# Patient Record
Sex: FEMALE | Race: WHITE | Employment: UNEMPLOYED | URBAN - METROPOLITAN AREA
[De-identification: names, ages, dates, MRNs, and addresses within clinical notes are randomized per-mention and may not be internally consistent; named-entity substitution may affect disease eponyms.]

---

## 2017-01-20 ENCOUNTER — TELEPHONE (OUTPATIENT)
Dept: INTERNAL MEDICINE CLINIC | Facility: CLINIC | Age: 32
End: 2017-01-20

## 2017-01-20 RX ORDER — AZITHROMYCIN 250 MG/1
TABLET, FILM COATED ORAL
Qty: 6 TABLET | Refills: 0 | Status: SHIPPED | OUTPATIENT
Start: 2017-01-20 | End: 2017-05-15 | Stop reason: ALTCHOICE

## 2017-01-20 NOTE — TELEPHONE ENCOUNTER
Please advise - called patient who states she has a very sore throat for a couple of days , with earache, sinuspressure and headache , glands are swollen , denies cough and fever- cannot come into office and will go out of town-to DR. FORDE Sunday

## 2017-01-20 NOTE — TELEPHONE ENCOUNTER
Patient called back and was notified that Jaiden Alicea prescribed by Tommie Barba and eRxed to her pharmacy, encouraged patient to use OTC cough remedies if needed and call us on Monday or Tuesday if not better

## 2017-01-20 NOTE — TELEPHONE ENCOUNTER
Okay to send in a Z-Niels for her, and encouraged her to use over-the-counter cough remedies if needed along with the antibiotics, have her call us on Monday or Tuesday if she is not better.

## 2017-01-20 NOTE — TELEPHONE ENCOUNTER
Pt. Called stating she has swollen glands and a very sore throat. She is not able to come in today and will be out of town for work next week. Can Dr. Amish Giron something for her? She uses [facility] [address].

## 2017-04-01 ENCOUNTER — APPOINTMENT (OUTPATIENT)
Dept: LAB | Facility: HOSPITAL | Age: 32
End: 2017-04-01
Attending: OBSTETRICS & GYNECOLOGY

## 2017-04-01 DIAGNOSIS — N92.6 IRREGULAR MENSES: ICD-10-CM

## 2017-04-01 PROCEDURE — 84443 ASSAY THYROID STIM HORMONE: CPT

## 2017-04-01 PROCEDURE — 36415 COLL VENOUS BLD VENIPUNCTURE: CPT

## 2017-04-03 PROCEDURE — 88175 CYTOPATH C/V AUTO FLUID REDO: CPT | Performed by: OBSTETRICS & GYNECOLOGY

## 2017-12-18 ENCOUNTER — TELEPHONE (OUTPATIENT)
Dept: INTERNAL MEDICINE CLINIC | Facility: CLINIC | Age: 32
End: 2017-12-18

## 2017-12-18 RX ORDER — AZITHROMYCIN 250 MG/1
TABLET, FILM COATED ORAL
Qty: 6 TABLET | Refills: 0 | Status: SHIPPED | OUTPATIENT
Start: 2017-12-18 | End: 2019-05-25

## 2017-12-18 NOTE — TELEPHONE ENCOUNTER
Noted, you can offer her a Z-Niels, but encourage her to come in and see me, she does not see me in a couple years, try to get her on my schedule for next year for a physical.

## 2017-12-18 NOTE — TELEPHONE ENCOUNTER
Patient has a really bad cold/Has loose productive cough, nasal congestion with yellow mucous  Tried over the counter meds which sometimes helps - unable to come in to the office   Can Dr Allen Allen prescribe for patient - [facility]      Ple

## 2018-02-21 PROCEDURE — 84144 ASSAY OF PROGESTERONE: CPT | Performed by: OBSTETRICS & GYNECOLOGY

## 2018-03-03 ENCOUNTER — HOSPITAL (OUTPATIENT)
Dept: OTHER | Age: 33
End: 2018-03-03
Attending: EMERGENCY MEDICINE

## 2018-03-03 LAB
AMORPH SED URNS QL MICRO: ABNORMAL
ANALYZER ANC (IANC): ABNORMAL
APPEARANCE UR: CLEAR
BACTERIA #/AREA URNS HPF: ABNORMAL /HPF
BASOPHILS # BLD: 0 THOUSAND/MCL (ref 0–0.3)
BASOPHILS NFR BLD: 0 %
BILIRUB UR QL: NEGATIVE
CAOX CRY URNS QL MICRO: ABNORMAL
COLOR UR: COLORLESS
DIFFERENTIAL METHOD BLD: ABNORMAL
EOSINOPHIL # BLD: 0.3 THOUSAND/MCL (ref 0.1–0.5)
EOSINOPHIL NFR BLD: 3 %
EPITH CASTS #/AREA URNS LPF: ABNORMAL /[LPF]
ERYTHROCYTE [DISTWIDTH] IN BLOOD: 11.2 % (ref 11–15)
FATTY CASTS #/AREA URNS LPF: ABNORMAL /[LPF]
GLUCOSE UR-MCNC: NEGATIVE MG/DL
GRAN CASTS #/AREA URNS LPF: ABNORMAL /[LPF]
HCG SERPL-ACNC: ABNORMAL MUNIT/ML
HEMATOCRIT: 37.2 % (ref 36–46.5)
HGB BLD-MCNC: 13.2 GM/DL (ref 12–15.5)
HGB UR QL: NEGATIVE
HYALINE CASTS #/AREA URNS LPF: ABNORMAL /LPF (ref 0–5)
KETONES UR-MCNC: NEGATIVE MG/DL
LEUKOCYTE ESTERASE UR QL STRIP: NEGATIVE
LYMPHOCYTES # BLD: 2.6 THOUSAND/MCL (ref 1–4.8)
LYMPHOCYTES NFR BLD: 26 %
MCH RBC QN AUTO: 33.2 PG (ref 26–34)
MCHC RBC AUTO-ENTMCNC: 35.5 GM/DL (ref 32–36.5)
MCV RBC AUTO: 93.7 FL (ref 78–100)
MIXED CELL CASTS #/AREA URNS LPF: ABNORMAL /[LPF]
MONOCYTES # BLD: 0.8 THOUSAND/MCL (ref 0.3–0.9)
MONOCYTES NFR BLD: 8 %
MUCOUS THREADS URNS QL MICRO: ABNORMAL
NEUTROPHILS # BLD: 6.5 THOUSAND/MCL (ref 1.8–7.7)
NEUTROPHILS NFR BLD: 63 %
NEUTS SEG NFR BLD: ABNORMAL %
NITRITE UR QL: NEGATIVE
PERCENT NRBC: ABNORMAL
PH UR: 6 UNIT (ref 5–7)
PLATELET # BLD: 397 THOUSAND/MCL (ref 140–450)
PROT UR QL: NEGATIVE MG/DL
RBC # BLD: 3.97 MILLION/MCL (ref 4–5.2)
RBC #/AREA URNS HPF: ABNORMAL /HPF (ref 0–3)
RBC CASTS #/AREA URNS LPF: ABNORMAL /[LPF]
RENAL EPI CELLS #/AREA URNS HPF: ABNORMAL /[HPF]
SP GR UR: <1.005 (ref 1–1.03)
SPECIMEN SOURCE: ABNORMAL
SPERM URNS QL MICRO: ABNORMAL
SQUAMOUS #/AREA URNS HPF: ABNORMAL /HPF (ref 0–5)
T VAGINALIS URNS QL MICRO: ABNORMAL
TRI-PHOS CRY URNS QL MICRO: ABNORMAL
URATE CRY URNS QL MICRO: ABNORMAL
URINE REFLEX: ABNORMAL
URNS CMNT MICRO: ABNORMAL
UROBILINOGEN UR QL: 0.2 MG/DL (ref 0–1)
WAXY CASTS #/AREA URNS LPF: ABNORMAL /[LPF]
WBC # BLD: 10.2 THOUSAND/MCL (ref 4.2–11)
WBC #/AREA URNS HPF: ABNORMAL /HPF (ref 0–5)
WBC CASTS #/AREA URNS LPF: ABNORMAL /[LPF]
YEAST HYPHAE URNS QL MICRO: ABNORMAL
YEAST URNS QL MICRO: ABNORMAL

## 2018-03-20 ENCOUNTER — HOSPITAL (OUTPATIENT)
Dept: OTHER | Age: 33
End: 2018-03-20
Attending: OBSTETRICS & GYNECOLOGY

## 2018-03-20 PROBLEM — O09.829: Status: ACTIVE | Noted: 2018-03-20

## 2018-03-20 PROBLEM — Z34.90 PREGNANT: Status: ACTIVE | Noted: 2018-03-20

## 2018-03-20 PROBLEM — Z34.90 PREGNANT (HCC): Status: ACTIVE | Noted: 2018-03-20

## 2018-03-20 LAB
ANALYZER ANC (IANC): ABNORMAL
BASOPHILS # BLD: 0 THOUSAND/MCL (ref 0–0.3)
BASOPHILS NFR BLD: 0 %
DIFFERENTIAL METHOD BLD: ABNORMAL
EOSINOPHIL # BLD: 0.5 THOUSAND/MCL (ref 0.1–0.5)
EOSINOPHIL NFR BLD: 5 %
ERYTHROCYTE [DISTWIDTH] IN BLOOD: 11.4 % (ref 11–15)
HBV SURFACE AG SER QL: NEGATIVE
HEMATOCRIT: 35.9 % (ref 36–46.5)
HGB BLD-MCNC: 12.1 GM/DL (ref 12–15.5)
HIV ANTIGEN/ANTIBODY SCREEN: NONREACTIVE
LYMPHOCYTES # BLD: 2.5 THOUSAND/MCL (ref 1–4.8)
LYMPHOCYTES NFR BLD: 23 %
MCH RBC QN AUTO: 31.8 PG (ref 26–34)
MCHC RBC AUTO-ENTMCNC: 33.7 GM/DL (ref 32–36.5)
MCV RBC AUTO: 94.2 FL (ref 78–100)
MONOCYTES # BLD: 0.7 THOUSAND/MCL (ref 0.3–0.9)
MONOCYTES NFR BLD: 6 %
NEUTROPHILS # BLD: 7 THOUSAND/MCL (ref 1.8–7.7)
NEUTROPHILS NFR BLD: 66 %
NEUTS SEG NFR BLD: ABNORMAL %
PERCENT NRBC: ABNORMAL
PLATELET # BLD: 362 THOUSAND/MCL (ref 140–450)
RBC # BLD: 3.81 MILLION/MCL (ref 4–5.2)
RUBV IGG SERPL IA-ACNC: 150.2 UNIT/ML
T PALLIDUM IGG SER QL IA: NONREACTIVE
WBC # BLD: 10.7 THOUSAND/MCL (ref 4.2–11)

## 2018-04-11 PROBLEM — O99.340 ANXIETY IN PREGNANCY, ANTEPARTUM (HCC): Status: ACTIVE | Noted: 2018-04-11

## 2018-04-11 PROBLEM — O99.340 ANXIETY IN PREGNANCY, ANTEPARTUM: Status: ACTIVE | Noted: 2018-04-11

## 2018-04-11 PROBLEM — F41.9 ANXIETY IN PREGNANCY, ANTEPARTUM: Status: ACTIVE | Noted: 2018-04-11

## 2018-04-11 PROBLEM — F41.9 ANXIETY IN PREGNANCY, ANTEPARTUM (HCC): Status: ACTIVE | Noted: 2018-04-11

## 2018-06-13 PROCEDURE — 87591 N.GONORRHOEAE DNA AMP PROB: CPT | Performed by: OBSTETRICS & GYNECOLOGY

## 2018-06-13 PROCEDURE — 87491 CHLMYD TRACH DNA AMP PROBE: CPT | Performed by: OBSTETRICS & GYNECOLOGY

## 2018-08-06 PROBLEM — O99.019 ANTEPARTUM ANEMIA: Status: ACTIVE | Noted: 2018-08-06

## 2018-08-06 PROBLEM — O99.019 ANTEPARTUM ANEMIA (HCC): Status: ACTIVE | Noted: 2018-08-06

## 2018-08-06 PROCEDURE — 87389 HIV-1 AG W/HIV-1&-2 AB AG IA: CPT | Performed by: OBSTETRICS & GYNECOLOGY

## 2018-10-24 ENCOUNTER — HOSPITAL (OUTPATIENT)
Dept: OTHER | Age: 33
End: 2018-10-24
Attending: OBSTETRICS & GYNECOLOGY

## 2018-10-24 LAB
ANALYZER ANC (IANC): ABNORMAL
BASOPHILS # BLD: 0 THOUSAND/MCL (ref 0–0.3)
BASOPHILS NFR BLD: 0 %
DIFFERENTIAL METHOD BLD: ABNORMAL
EOSINOPHIL # BLD: 0.4 THOUSAND/MCL (ref 0.1–0.5)
EOSINOPHIL NFR BLD: 4 %
ERYTHROCYTE [DISTWIDTH] IN BLOOD: 13.1 % (ref 11–15)
HEMATOCRIT: 32.9 % (ref 36–46.5)
HGB BLD-MCNC: 11 GM/DL (ref 12–15.5)
LYMPHOCYTES # BLD: 2 THOUSAND/MCL (ref 1–4.8)
LYMPHOCYTES NFR BLD: 21 %
MCH RBC QN AUTO: 30.7 PG (ref 26–34)
MCHC RBC AUTO-ENTMCNC: 33.4 GM/DL (ref 32–36.5)
MCV RBC AUTO: 91.9 FL (ref 78–100)
MONOCYTES # BLD: 0.8 THOUSAND/MCL (ref 0.3–0.9)
MONOCYTES NFR BLD: 8 %
NEUTROPHILS # BLD: 6.6 THOUSAND/MCL (ref 1.8–7.7)
NEUTROPHILS NFR BLD: 67 %
NEUTS SEG NFR BLD: ABNORMAL %
NRBC (NRBCRE): ABNORMAL
PLATELET # BLD: 199 THOUSAND/MCL (ref 140–450)
RBC # BLD: 3.58 MILLION/MCL (ref 4–5.2)
WBC # BLD: 9.9 THOUSAND/MCL (ref 4.2–11)

## 2018-10-25 LAB
ANALYZER ANC (IANC): ABNORMAL
ANION GAP SERPL CALC-SCNC: 14 MMOL/L (ref 10–20)
BASE DEFICIT BLDCOA-SCNC: NORMAL MMOL/L
BASE DEFICIT BLDCOV-SCNC: 3 MMOL/L
BASE EXCESS BLDCOA CALC-SCNC: 0 MMOL/L
BASE EXCESS-RC: NORMAL
BUN SERPL-MCNC: 7 MG/DL (ref 6–20)
BUN/CREAT SERPL: 10 (ref 7–25)
CALCIUM SERPL-MCNC: 8 MG/DL (ref 8.4–10.2)
CHLORIDE: 105 MMOL/L (ref 98–107)
CO2 SERPL-SCNC: 23 MMOL/L (ref 21–32)
CREAT SERPL-MCNC: 0.71 MG/DL (ref 0.51–0.95)
ERYTHROCYTE [DISTWIDTH] IN BLOOD: 13.1 % (ref 11–15)
GLUCOSE SERPL-MCNC: 100 MG/DL (ref 65–99)
HCO3 BLDCOA-SCNC: 28 MMOL/L (ref 21–28)
HCO3 BLDCOV-SCNC: 23 MMOL/L (ref 22–29)
HEMATOCRIT: 27.6 % (ref 36–46.5)
HGB BLD-MCNC: 9.1 GM/DL (ref 12–15.5)
MCH RBC QN AUTO: 30.2 PG (ref 26–34)
MCHC RBC AUTO-ENTMCNC: 33 GM/DL (ref 32–36.5)
MCV RBC AUTO: 91.7 FL (ref 78–100)
NRBC (NRBCRE): ABNORMAL
PCO2 BLDCOA: 60 MM HG (ref 31–74)
PCO2 BLDCOV: 43 MM HG (ref 23–49)
PH BLDCOA: 7.28 UNIT (ref 7.18–7.38)
PH BLDCOV: 7.34 UNIT (ref 7.25–7.45)
PLATELET # BLD: 181 THOUSAND/MCL (ref 140–450)
PO2 BLDCOV: 31 MM HG (ref 17–41)
PO2 RC ARTERIAL CORD (RACPO): <20 MM HG (ref 6–31)
POTASSIUM SERPL-SCNC: 3.9 MMOL/L (ref 3.4–5.1)
RBC # BLD: 3.01 MILLION/MCL (ref 4–5.2)
SODIUM SERPL-SCNC: 138 MMOL/L (ref 135–145)
WBC # BLD: 15.8 THOUSAND/MCL (ref 4.2–11)

## 2018-11-06 PROBLEM — Z34.90 PREGNANT: Status: RESOLVED | Noted: 2018-03-20 | Resolved: 2018-11-06

## 2018-11-06 PROBLEM — Z34.90 PREGNANT (HCC): Status: RESOLVED | Noted: 2018-03-20 | Resolved: 2018-11-06

## 2019-05-25 ENCOUNTER — NURSE ONLY (OUTPATIENT)
Dept: FAMILY MEDICINE CLINIC | Facility: CLINIC | Age: 34
End: 2019-05-25
Payer: COMMERCIAL

## 2019-05-25 VITALS
TEMPERATURE: 98 F | OXYGEN SATURATION: 98 % | RESPIRATION RATE: 16 BRPM | DIASTOLIC BLOOD PRESSURE: 65 MMHG | BODY MASS INDEX: 24.15 KG/M2 | HEART RATE: 97 BPM | HEIGHT: 60 IN | SYSTOLIC BLOOD PRESSURE: 102 MMHG | WEIGHT: 123 LBS

## 2019-05-25 DIAGNOSIS — J30.2 SEASONAL ALLERGIES: Primary | ICD-10-CM

## 2019-05-25 DIAGNOSIS — B97.89 ACUTE VIRAL SINUSITIS: ICD-10-CM

## 2019-05-25 DIAGNOSIS — J01.90 ACUTE VIRAL SINUSITIS: ICD-10-CM

## 2019-05-25 PROCEDURE — 99202 OFFICE O/P NEW SF 15 MIN: CPT | Performed by: NURSE PRACTITIONER

## 2019-05-25 NOTE — PROGRESS NOTES
CHIEF COMPLAINT:   Patient presents with:  Sinus Problem: X 2 days      HPI:   Sherren [place] is a 34year old female who presents for cold symptoms for  2  days. Reports symptoms started 2 days ago with itchy eyes and throat.   Symptoms have progressed Alcohol/week: 0.0 oz      Comment: occ    Drug use: No        REVIEW OF SYSTEMS:   GENERAL:  denies  diminished appetite  SKIN: no rashes or abnormal skin lesions  HEENT: See HPI.     LUNGS: denies shortness of breath or wheezing, See HPI  CARDIOVASCULA Can do Benadryl at night as needed  Sudafed as needed  Follow up for worsening symptoms or no improvement    Allergic Rhinitis  Allergic rhinitis is an allergic reaction that affects the nose, and often the eyes. It’s often known as nasal allergies.  Nasal · Fix water leaks  Mold:  · Keep humidity low by using a dehumidifier or air conditioner. Keep the dehumidifier and air conditioner clean and free of mold. · Clean moldy areas with bleach and water. In general:  · Vacuum once or twice a week.  If possible · Drink plenty of water, hot tea, and other liquids. This may help thin nasal mucus. It also may help your sinuses drain fluids. · Heat may help soothe painful areas of your face. Use a towel soaked in hot water.  Or,  the shower and direct the war · Facial pain or headache that gets worse  · Stiff neck  · Unusual drowsiness or confusion  · Swelling of your forehead or eyelids  · Vision problems, such as blurred or double vision  · Fever of 100.4ºF (38ºC) or higher, or as directed by your healthcare

## 2019-05-25 NOTE — PATIENT INSTRUCTIONS
Humidifier in room  Sleep propped  Push fluids  Limit dairy  Continue Claritin  Flonase as directed  Can do Benadryl at night as needed  Sudafed as needed  Follow up for worsening symptoms or no improvement    Allergic Rhinitis  Allergic rhinitis is an a Cockroaches:  · Store food in sealed containers. · Remove garbage from the home promptly. · Fix water leaks  Mold:  · Keep humidity low by using a dehumidifier or air conditioner. Keep the dehumidifier and air conditioner clean and free of mold.   · Clean The sinuses are air-filled spaces within the bones of the face. They connect to the inside of the nose. Sinusitis is an inflammation of the tissue that lines the sinuses.  Sinusitis can occur during a cold. It can also happen due to allergies to pollens and · Use acetaminophen or ibuprofen to control pain, unless another pain medicine was prescribed to you. If you have chronic liver or kidney disease or ever had a stomach ulcer, talk with your healthcare provider before using these medicines.  (Aspirin should

## 2019-12-21 ENCOUNTER — OFFICE VISIT (OUTPATIENT)
Dept: FAMILY MEDICINE CLINIC | Facility: CLINIC | Age: 34
End: 2019-12-21
Payer: COMMERCIAL

## 2019-12-21 VITALS
WEIGHT: 129 LBS | BODY MASS INDEX: 25.32 KG/M2 | HEART RATE: 61 BPM | DIASTOLIC BLOOD PRESSURE: 60 MMHG | TEMPERATURE: 98 F | SYSTOLIC BLOOD PRESSURE: 110 MMHG | OXYGEN SATURATION: 98 % | HEIGHT: 60 IN

## 2019-12-21 DIAGNOSIS — J02.9 SORE THROAT: Primary | ICD-10-CM

## 2019-12-21 DIAGNOSIS — J00 ACUTE NASOPHARYNGITIS: ICD-10-CM

## 2019-12-21 PROCEDURE — 87081 CULTURE SCREEN ONLY: CPT | Performed by: PHYSICIAN ASSISTANT

## 2019-12-21 PROCEDURE — 87880 STREP A ASSAY W/OPTIC: CPT | Performed by: PHYSICIAN ASSISTANT

## 2019-12-21 PROCEDURE — 99202 OFFICE O/P NEW SF 15 MIN: CPT | Performed by: PHYSICIAN ASSISTANT

## 2019-12-21 RX ORDER — BROMPHENIRAMINE MALEATE, PSEUDOEPHEDRINE HYDROCHLORIDE, AND DEXTROMETHORPHAN HYDROBROMIDE 2; 30; 10 MG/5ML; MG/5ML; MG/5ML
10 SYRUP ORAL 4 TIMES DAILY PRN
Qty: 120 ML | Refills: 0 | Status: SHIPPED | OUTPATIENT
Start: 2019-12-21 | End: 2020-01-02 | Stop reason: ALTCHOICE

## 2019-12-21 NOTE — PROGRESS NOTES
CHIEF COMPLAINT:   Patient presents with:  Cold: X 4 days, head congestion, sore throat, headache, exposed to strep     HPI:   Chip Saavedra is a 30year old female who presents for upper respiratory symptoms for  4 days.  Patient reports sore throat, breath or wheezing, See HPI  CARDIOVASCULAR: denies chest pain or palpitations   GI: denies N/V/C or abdominal pain  NEURO: + sinus headaches    EXAM:   /60   Pulse 61   Temp 98 °F (36.7 °C) (Oral)   Ht 60\"   Wt 129 lb (58.5 kg)   SpO2 98%   BMI 25. Visit:  Requested Prescriptions     Signed Prescriptions Disp Refills   • Ltylgrofu-Mcxebrmc-OZ (BROMFED DM) 30-2-10 MG/5ML Oral Syrup 120 mL 0     Sig: Take 10 mL by mouth 4 (four) times daily as needed.      Patient Instructions   Please follow up with yo humidifier/vaporizer at your bedside, elevate the head of your bed (sleep with an extra pillow), and vapor rub to your chest.   · Salt water gargles (1/4 tsp of salt in a cup warm water) and throat lozenges can help with a sore throat.    · If you do not ha

## 2019-12-21 NOTE — PATIENT INSTRUCTIONS
Please follow up with your PCP if no improvement within 5-7 days. Go directly to the ER for any acute worsening of symptoms. We will send out a throat culture and will contact you with the results in 48-72 hours.  If positive, then we will call in an appr · If you do not have a fever, you may use warm compresses on your forehead and try steam therapy to help break up the congestion (take a hot steamy shower).  NSAIDs, such as ibuprofen (Advil, Motrin) or naproxen (Aleve) work well for pain, fever and also

## 2020-01-02 ENCOUNTER — OFFICE VISIT (OUTPATIENT)
Dept: INTERNAL MEDICINE CLINIC | Facility: CLINIC | Age: 35
End: 2020-01-02
Payer: COMMERCIAL

## 2020-01-02 VITALS
HEIGHT: 60.3 IN | HEART RATE: 77 BPM | SYSTOLIC BLOOD PRESSURE: 104 MMHG | TEMPERATURE: 98 F | DIASTOLIC BLOOD PRESSURE: 68 MMHG | WEIGHT: 128.38 LBS | BODY MASS INDEX: 24.88 KG/M2 | OXYGEN SATURATION: 99 %

## 2020-01-02 DIAGNOSIS — F41.9 ANXIETY: ICD-10-CM

## 2020-01-02 DIAGNOSIS — E28.2 PCOS (POLYCYSTIC OVARIAN SYNDROME): ICD-10-CM

## 2020-01-02 DIAGNOSIS — Z00.00 PHYSICAL EXAM: Primary | ICD-10-CM

## 2020-01-02 DIAGNOSIS — F32.4 MAJOR DEPRESSIVE DISORDER IN PARTIAL REMISSION, UNSPECIFIED WHETHER RECURRENT (HCC): ICD-10-CM

## 2020-01-02 PROBLEM — O99.340 ANXIETY IN PREGNANCY, ANTEPARTUM (HCC): Status: RESOLVED | Noted: 2018-04-11 | Resolved: 2020-01-02

## 2020-01-02 PROBLEM — O09.829: Status: RESOLVED | Noted: 2018-03-20 | Resolved: 2020-01-02

## 2020-01-02 PROBLEM — O99.019 ANTEPARTUM ANEMIA: Status: RESOLVED | Noted: 2018-08-06 | Resolved: 2020-01-02

## 2020-01-02 PROBLEM — O99.340 ANXIETY IN PREGNANCY, ANTEPARTUM: Status: RESOLVED | Noted: 2018-04-11 | Resolved: 2020-01-02

## 2020-01-02 PROBLEM — O99.019 ANTEPARTUM ANEMIA (HCC): Status: RESOLVED | Noted: 2018-08-06 | Resolved: 2020-01-02

## 2020-01-02 PROCEDURE — 99385 PREV VISIT NEW AGE 18-39: CPT | Performed by: INTERNAL MEDICINE

## 2020-01-02 RX ORDER — BUSPIRONE HYDROCHLORIDE 5 MG/1
5 TABLET ORAL 2 TIMES DAILY
Qty: 60 TABLET | Refills: 2 | Status: SHIPPED | OUTPATIENT
Start: 2020-01-02 | End: 2020-07-03

## 2020-01-02 RX ORDER — DULOXETIN HYDROCHLORIDE 30 MG/1
30 CAPSULE, DELAYED RELEASE ORAL DAILY
Qty: 30 CAPSULE | Refills: 2 | Status: SHIPPED | OUTPATIENT
Start: 2020-01-02 | End: 2020-05-04

## 2020-01-02 NOTE — PROGRESS NOTES
Jonathan Espino is a 30year old female who presents for a complete physical exam.   HPI:   Pt complains of:    Patient presents with:  Physical: Pt here for annual physical, pt declines flu shot at this time  Anxiety: Pt would like Rx for anxiety medic loss.  ENMT: Negative for Nasal drainage and sinus pressure. Eyes: Negative for Vision changes. Respiratory: Negative for Cough, dyspnea and wheezing. Cardio: Negative Chest pain and irregular heartbeat/palpitations.   GI: Negative for Abdominal pain, co questions. - CBC WITH DIFFERENTIAL WITH PLATELET; Future  - COMP METABOLIC PANEL (14); Future  - TSH W REFLEX TO FREE T4; Future  - URINALYSIS WITH CULTURE REFLEX  - VITAMIN D, 25-HYDROXY; Future  - LIPID PANEL; Future    2.  Major depressive disorder in

## 2020-01-04 ENCOUNTER — LAB ENCOUNTER (OUTPATIENT)
Dept: LAB | Facility: HOSPITAL | Age: 35
End: 2020-01-04
Attending: INTERNAL MEDICINE

## 2020-01-04 DIAGNOSIS — Z00.00 PHYSICAL EXAM: ICD-10-CM

## 2020-01-04 LAB
ALBUMIN SERPL-MCNC: 4 G/DL (ref 3.4–5)
ALBUMIN/GLOB SERPL: 1.2 {RATIO} (ref 1–2)
ALP LIVER SERPL-CCNC: 72 U/L (ref 37–98)
ALT SERPL-CCNC: 37 U/L (ref 13–56)
ANION GAP SERPL CALC-SCNC: 7 MMOL/L (ref 0–18)
AST SERPL-CCNC: 19 U/L (ref 15–37)
BACTERIA UR QL AUTO: NEGATIVE /HPF
BASOPHILS # BLD AUTO: 0.05 X10(3) UL (ref 0–0.2)
BASOPHILS NFR BLD AUTO: 0.3 %
BILIRUB SERPL-MCNC: 0.4 MG/DL (ref 0.1–2)
BILIRUB UR QL: NEGATIVE
BUN BLD-MCNC: 20 MG/DL (ref 7–18)
BUN/CREAT SERPL: 21.1 (ref 10–20)
CALCIUM BLD-MCNC: 8.9 MG/DL (ref 8.5–10.1)
CHLORIDE SERPL-SCNC: 109 MMOL/L (ref 98–112)
CHOLEST SMN-MCNC: 163 MG/DL (ref ?–200)
CLARITY UR: CLEAR
CO2 SERPL-SCNC: 26 MMOL/L (ref 21–32)
COLOR UR: YELLOW
CREAT BLD-MCNC: 0.95 MG/DL (ref 0.55–1.02)
DEPRECATED RDW RBC AUTO: 39.8 FL (ref 35.1–46.3)
EOSINOPHIL # BLD AUTO: 0.29 X10(3) UL (ref 0–0.7)
EOSINOPHIL NFR BLD AUTO: 1.8 %
ERYTHROCYTE [DISTWIDTH] IN BLOOD BY AUTOMATED COUNT: 11.5 % (ref 11–15)
GLOBULIN PLAS-MCNC: 3.3 G/DL (ref 2.8–4.4)
GLUCOSE BLD-MCNC: 99 MG/DL (ref 70–99)
GLUCOSE UR-MCNC: NEGATIVE MG/DL
HCT VFR BLD AUTO: 40.7 % (ref 35–48)
HDLC SERPL-MCNC: 51 MG/DL (ref 40–59)
HGB BLD-MCNC: 13.5 G/DL (ref 12–16)
IMM GRANULOCYTES # BLD AUTO: 0.05 X10(3) UL (ref 0–1)
IMM GRANULOCYTES NFR BLD: 0.3 %
KETONES UR-MCNC: NEGATIVE MG/DL
LDLC SERPL CALC-MCNC: 103 MG/DL (ref ?–100)
LEUKOCYTE ESTERASE UR QL STRIP.AUTO: NEGATIVE
LYMPHOCYTES # BLD AUTO: 2.03 X10(3) UL (ref 1–4)
LYMPHOCYTES NFR BLD AUTO: 12.6 %
M PROTEIN MFR SERPL ELPH: 7.3 G/DL (ref 6.4–8.2)
MCH RBC QN AUTO: 31.6 PG (ref 26–34)
MCHC RBC AUTO-ENTMCNC: 33.2 G/DL (ref 31–37)
MCV RBC AUTO: 95.3 FL (ref 80–100)
MONOCYTES # BLD AUTO: 0.98 X10(3) UL (ref 0.1–1)
MONOCYTES NFR BLD AUTO: 6.1 %
NEUTROPHILS # BLD AUTO: 12.75 X10 (3) UL (ref 1.5–7.7)
NEUTROPHILS # BLD AUTO: 12.75 X10(3) UL (ref 1.5–7.7)
NEUTROPHILS NFR BLD AUTO: 78.9 %
NITRITE UR QL STRIP.AUTO: NEGATIVE
NONHDLC SERPL-MCNC: 112 MG/DL (ref ?–130)
OSMOLALITY SERPL CALC.SUM OF ELEC: 297 MOSM/KG (ref 275–295)
PATIENT FASTING Y/N/NP: YES
PATIENT FASTING Y/N/NP: YES
PH UR: 5 [PH] (ref 5–8)
PLATELET # BLD AUTO: 370 10(3)UL (ref 150–450)
POTASSIUM SERPL-SCNC: 3.7 MMOL/L (ref 3.5–5.1)
PROT UR-MCNC: NEGATIVE MG/DL
RBC # BLD AUTO: 4.27 X10(6)UL (ref 3.8–5.3)
RBC #/AREA URNS AUTO: 1 /HPF
SODIUM SERPL-SCNC: 142 MMOL/L (ref 136–145)
SP GR UR STRIP: 1.03 (ref 1–1.03)
TRIGL SERPL-MCNC: 43 MG/DL (ref 30–149)
TSI SER-ACNC: 1.15 MIU/ML (ref 0.36–3.74)
UROBILINOGEN UR STRIP-ACNC: <2
VLDLC SERPL CALC-MCNC: 9 MG/DL (ref 0–30)
WBC # BLD AUTO: 16.2 X10(3) UL (ref 4–11)
WBC #/AREA URNS AUTO: 2 /HPF

## 2020-01-04 PROCEDURE — 85025 COMPLETE CBC W/AUTO DIFF WBC: CPT

## 2020-01-04 PROCEDURE — 80061 LIPID PANEL: CPT

## 2020-01-04 PROCEDURE — 36415 COLL VENOUS BLD VENIPUNCTURE: CPT

## 2020-01-04 PROCEDURE — 82306 VITAMIN D 25 HYDROXY: CPT

## 2020-01-04 PROCEDURE — 81001 URINALYSIS AUTO W/SCOPE: CPT | Performed by: INTERNAL MEDICINE

## 2020-01-04 PROCEDURE — 84443 ASSAY THYROID STIM HORMONE: CPT

## 2020-01-04 PROCEDURE — 80053 COMPREHEN METABOLIC PANEL: CPT

## 2020-01-06 LAB — 25(OH)D3 SERPL-MCNC: 22.5 NG/ML (ref 30–100)

## 2020-01-07 ENCOUNTER — TELEPHONE (OUTPATIENT)
Dept: INTERNAL MEDICINE CLINIC | Facility: CLINIC | Age: 35
End: 2020-01-07

## 2020-01-07 NOTE — TELEPHONE ENCOUNTER
Nursing, you will have to call this patient, let her know that I did review her lab work, everything here looks pretty good, vitamin D number is a bit low, so she wants to take some extra vitamin D that is not a bad idea.   I recommend at least 5000 interna

## 2020-01-08 NOTE — TELEPHONE ENCOUNTER
I spoke with patient and relayed Dr. Micky Larose message. She verbalized understanding. She says she did have a sore throat the day after she was seen in the office. Today she says she feels fine.  She now feels very worried about this and asks if she needs t

## 2020-01-09 RX ORDER — AZITHROMYCIN 250 MG/1
TABLET, FILM COATED ORAL
Qty: 6 TABLET | Refills: 0 | Status: SHIPPED | OUTPATIENT
Start: 2020-01-09 | End: 2020-01-20 | Stop reason: ALTCHOICE

## 2020-01-09 NOTE — TELEPHONE ENCOUNTER
To Dr. Ciera Ware - Pt states she has been fighting sore throat, cough, now ears are clogged. Pt states she does have chills and will get hot as well - temp not taken. Worst day was Sunday, couldn't swallow, keep her eyes open.   Monday better, Tuesday worse

## 2020-01-09 NOTE — TELEPHONE ENCOUNTER
Patient called back. I relayed to her that Dr Angelo Malik is sending in a zpak for her. She would like it sent to [facility] in [address] [place] [address].

## 2020-01-09 NOTE — TELEPHONE ENCOUNTER
Please call pt with status  She thought she was going to hear back from someone last night  Tasked to nursing

## 2020-01-17 ENCOUNTER — APPOINTMENT (OUTPATIENT)
Dept: LAB | Facility: HOSPITAL | Age: 35
End: 2020-01-17
Attending: INTERNAL MEDICINE
Payer: COMMERCIAL

## 2020-01-17 ENCOUNTER — TELEPHONE (OUTPATIENT)
Dept: INTERNAL MEDICINE CLINIC | Facility: CLINIC | Age: 35
End: 2020-01-17

## 2020-01-17 DIAGNOSIS — D72.829 LEUKOCYTOSIS, UNSPECIFIED TYPE: Primary | ICD-10-CM

## 2020-01-17 LAB
BASOPHILS # BLD AUTO: 0.05 X10(3) UL (ref 0–0.2)
BASOPHILS NFR BLD AUTO: 0.6 %
DEPRECATED RDW RBC AUTO: 38.5 FL (ref 35.1–46.3)
EOSINOPHIL # BLD AUTO: 0.29 X10(3) UL (ref 0–0.7)
EOSINOPHIL NFR BLD AUTO: 3.4 %
ERYTHROCYTE [DISTWIDTH] IN BLOOD BY AUTOMATED COUNT: 11.2 % (ref 11–15)
HCT VFR BLD AUTO: 41.1 % (ref 35–48)
HGB BLD-MCNC: 13.5 G/DL (ref 12–16)
IMM GRANULOCYTES # BLD AUTO: 0.03 X10(3) UL (ref 0–1)
IMM GRANULOCYTES NFR BLD: 0.3 %
LYMPHOCYTES # BLD AUTO: 2.67 X10(3) UL (ref 1–4)
LYMPHOCYTES NFR BLD AUTO: 31.1 %
MCH RBC QN AUTO: 31.1 PG (ref 26–34)
MCHC RBC AUTO-ENTMCNC: 32.8 G/DL (ref 31–37)
MCV RBC AUTO: 94.7 FL (ref 80–100)
MONOCYTES # BLD AUTO: 0.56 X10(3) UL (ref 0.1–1)
MONOCYTES NFR BLD AUTO: 6.5 %
NEUTROPHILS # BLD AUTO: 4.98 X10 (3) UL (ref 1.5–7.7)
NEUTROPHILS # BLD AUTO: 4.98 X10(3) UL (ref 1.5–7.7)
NEUTROPHILS NFR BLD AUTO: 58.1 %
PLATELET # BLD AUTO: 369 10(3)UL (ref 150–450)
RBC # BLD AUTO: 4.34 X10(6)UL (ref 3.8–5.3)
WBC # BLD AUTO: 8.6 X10(3) UL (ref 4–11)

## 2020-01-17 PROCEDURE — 36415 COLL VENOUS BLD VENIPUNCTURE: CPT | Performed by: INTERNAL MEDICINE

## 2020-01-17 PROCEDURE — 85025 COMPLETE CBC W/AUTO DIFF WBC: CPT | Performed by: INTERNAL MEDICINE

## 2020-01-17 NOTE — TELEPHONE ENCOUNTER
Salvatore Jefferson is calling pt. Is at lab now and needs orders ph.  # [phone]  Routed high to clinical

## 2020-01-17 NOTE — TELEPHONE ENCOUNTER
Spoke to [place] who stated patient is requesting repeat CBC per MD orders. Per Dr. Arianna Livingston note in telephone encounter from 1/7:  \"The only problem on the lab work was the blood counts, does show she has an irritated white blood cell count at 16.   Cleatrice Reasons

## 2020-01-20 ENCOUNTER — OFFICE VISIT (OUTPATIENT)
Dept: INTERNAL MEDICINE CLINIC | Facility: CLINIC | Age: 35
End: 2020-01-20
Payer: COMMERCIAL

## 2020-01-20 VITALS
DIASTOLIC BLOOD PRESSURE: 66 MMHG | HEIGHT: 60.3 IN | WEIGHT: 126 LBS | TEMPERATURE: 98 F | BODY MASS INDEX: 24.41 KG/M2 | HEART RATE: 112 BPM | OXYGEN SATURATION: 98 % | SYSTOLIC BLOOD PRESSURE: 104 MMHG

## 2020-01-20 DIAGNOSIS — A08.4 VIRAL ENTERITIS: Primary | ICD-10-CM

## 2020-01-20 PROCEDURE — 99213 OFFICE O/P EST LOW 20 MIN: CPT | Performed by: INTERNAL MEDICINE

## 2020-01-20 NOTE — PROGRESS NOTES
Dominique Treadwell is a 30year old female. Patient presents with:  URI: Pt began feeling sick approximately 2 weeks ago. Woke up today with severe adb cramping, bloating and diarrhea x3 BMs today liquid in consistency, feeling dizzy.  C/o chills, body aches celiac disease   • Anxiety Father    • Hypertension Father    • Seizure Disorder Mother    • Stroke Neg       Social History:   Social History    Tobacco Use      Smoking status: Former Smoker        Quit date: 1/1/2010        Years since quitting:

## 2020-01-21 ENCOUNTER — TELEPHONE (OUTPATIENT)
Dept: INTERNAL MEDICINE CLINIC | Facility: CLINIC | Age: 35
End: 2020-01-21

## 2020-01-21 NOTE — TELEPHONE ENCOUNTER
Nursing. You can call the patient, let her know the repeat lab work looks good her white count has returned to normal, other lab work looks like it is straightened out. No reason for any further concerns or testing, follow-up with me as last discussed.

## 2020-02-27 ENCOUNTER — TELEPHONE (OUTPATIENT)
Dept: INTERNAL MEDICINE CLINIC | Facility: CLINIC | Age: 35
End: 2020-02-27

## 2020-02-27 RX ORDER — OSELTAMIVIR PHOSPHATE 75 MG/1
75 CAPSULE ORAL DAILY
Qty: 10 CAPSULE | Refills: 0 | Status: SHIPPED | OUTPATIENT
Start: 2020-02-27 | End: 2020-04-24

## 2020-02-27 NOTE — TELEPHONE ENCOUNTER
Dr. Zev Young message relayed to pt who verbalized understanding. Pt denies fever, states she feels fine but would like prophylaxis. States she will get flu shot when she picks up Tamiflu this afternoon. Tamiflu as ordered, eRx'[facility]ilis.

## 2020-02-27 NOTE — TELEPHONE ENCOUNTER
Patient calling asking for something to prevent her from getting flu. Mother has the influenza A  Patient did not get the flu shot. Having some symptoms. Throat scratchy and  sneezing. That's how her mother started with her flu symptoms.   Patient also ha

## 2020-02-27 NOTE — TELEPHONE ENCOUNTER
She needs to get the flu shot immediately, if she does want to go on Tamiflu, nursing you can send her in Tamiflu 75 mg daily for 10-day as a prophylaxis, if she is truly ill with a virus she needs to take treatment dose of Tamiflu twice a day for 5 days.

## 2020-05-04 ENCOUNTER — TELEPHONE (OUTPATIENT)
Dept: INTERNAL MEDICINE CLINIC | Facility: CLINIC | Age: 35
End: 2020-05-04

## 2020-05-04 DIAGNOSIS — F32.4 MAJOR DEPRESSIVE DISORDER IN PARTIAL REMISSION, UNSPECIFIED WHETHER RECURRENT (HCC): ICD-10-CM

## 2020-05-04 RX ORDER — DULOXETIN HYDROCHLORIDE 30 MG/1
CAPSULE, DELAYED RELEASE ORAL
Qty: 30 CAPSULE | Refills: 11 | Status: SHIPPED | OUTPATIENT
Start: 2020-05-04 | End: 2020-12-08

## 2020-05-04 NOTE — TELEPHONE ENCOUNTER
Pt. Needs refills on Duloxtine 30 mg caps. 1 daily. #30 with refills. Please send to [address] [place] [address] in [place]

## 2020-07-03 ENCOUNTER — MOBILE ENCOUNTER (OUTPATIENT)
Dept: INTERNAL MEDICINE CLINIC | Facility: CLINIC | Age: 35
End: 2020-07-03

## 2020-07-03 DIAGNOSIS — F41.9 ANXIETY: ICD-10-CM

## 2020-07-03 RX ORDER — BUSPIRONE HYDROCHLORIDE 5 MG/1
5 TABLET ORAL 2 TIMES DAILY
Qty: 60 TABLET | Refills: 2 | Status: SHIPPED | OUTPATIENT
Start: 2020-07-03 | End: 2020-11-04

## 2020-08-22 ENCOUNTER — TELEPHONE (OUTPATIENT)
Dept: INTERNAL MEDICINE CLINIC | Facility: CLINIC | Age: 35
End: 2020-08-22

## 2020-08-22 RX ORDER — METHOCARBAMOL 500 MG/1
TABLET, FILM COATED ORAL
Qty: 30 TABLET | Refills: 0 | Status: SHIPPED | OUTPATIENT
Start: 2020-08-22 | End: 2020-12-08 | Stop reason: ALTCHOICE

## 2020-08-22 NOTE — TELEPHONE ENCOUNTER
Patient called. She has been having some neck pain and low back pain for 1 week. No numbness or tingling. No fevers or chills. No injury.   She has been taking ibuprofen 2 tablets every 6 hours with some relief but she is wondering if she could get a li

## 2020-10-13 ENCOUNTER — IMMUNIZATION (OUTPATIENT)
Dept: INTERNAL MEDICINE CLINIC | Facility: CLINIC | Age: 35
End: 2020-10-13
Payer: COMMERCIAL

## 2020-10-13 DIAGNOSIS — Z23 NEED FOR VACCINATION: ICD-10-CM

## 2020-10-13 PROCEDURE — 90471 IMMUNIZATION ADMIN: CPT | Performed by: INTERNAL MEDICINE

## 2020-10-13 PROCEDURE — 90686 IIV4 VACC NO PRSV 0.5 ML IM: CPT | Performed by: INTERNAL MEDICINE

## 2020-11-04 ENCOUNTER — TELEPHONE (OUTPATIENT)
Dept: INTERNAL MEDICINE CLINIC | Facility: CLINIC | Age: 35
End: 2020-11-04

## 2020-11-04 DIAGNOSIS — F41.9 ANXIETY: ICD-10-CM

## 2020-11-04 NOTE — TELEPHONE ENCOUNTER
LMTCB    RN able to provide 30 day refill, however PCP will need to evaluate increase in dosage. RX pended.

## 2020-11-04 NOTE — TELEPHONE ENCOUNTER
Please advise - patient called back - relayed Blanca Begum message - verbalized understanding , wants to increase dosage of Buspirone - she wants to know if she needs appointment - to DR. FORDE

## 2020-11-04 NOTE — TELEPHONE ENCOUNTER
Pt. Called requesting refill on Buspirone. She was originally prescribed 5 mgs twice daily. Pt. Is asking if she can get a higher dose?   She only has 1 pill left and is asking if another physician can refill it due to Dr. Maribell Mcdaniel not being in the office

## 2020-11-05 DIAGNOSIS — F41.9 ANXIETY: ICD-10-CM

## 2020-11-05 RX ORDER — BUSPIRONE HYDROCHLORIDE 10 MG/1
10 TABLET ORAL 2 TIMES DAILY
Qty: 60 TABLET | Refills: 5 | Status: SHIPPED | OUTPATIENT
Start: 2020-11-05 | End: 2021-02-01

## 2020-11-05 NOTE — TELEPHONE ENCOUNTER
Yes, you can let her know frequently we do have to uptitrate this medication, it is scheduled for twice a day and I increased the milligrams to 10 mg per dose. I did send in refills for 6 months, nursing please let her know.

## 2020-11-06 RX ORDER — BUSPIRONE HYDROCHLORIDE 5 MG/1
TABLET ORAL
Qty: 60 TABLET | Refills: 0 | OUTPATIENT
Start: 2020-11-06

## 2020-11-06 NOTE — TELEPHONE ENCOUNTER
Sent in 11/5 per MD.     Current refill request refused due to refill is either a duplicate request or has active refills at the pharmacy. Check previous templates.     Requested Prescriptions     Refused Prescriptions Disp Refills   • BUSPIRONE HCL 5 MG O

## 2020-12-07 ENCOUNTER — TELEPHONE (OUTPATIENT)
Dept: INTERNAL MEDICINE CLINIC | Facility: CLINIC | Age: 35
End: 2020-12-07

## 2020-12-07 NOTE — TELEPHONE ENCOUNTER
Patient has been having sporadic/migraine type headaches   No other symptoms   Drinks plenty of water  Taking ibuprofen    Wanted to schedule w/Dr DAmico  Please call patient to triage for appointment    Call back # [phone]

## 2020-12-07 NOTE — TELEPHONE ENCOUNTER
To Dr. Moore Stands to please advise---  Pt began having severe headaches on and off for the past few weeks. Pt reports these headaches are not premeditated by any action which pt is aware of, they begin randomly at any time of day.  Pt reports being woken up to a few

## 2020-12-08 ENCOUNTER — OFFICE VISIT (OUTPATIENT)
Dept: INTERNAL MEDICINE CLINIC | Facility: CLINIC | Age: 35
End: 2020-12-08
Payer: COMMERCIAL

## 2020-12-08 VITALS
HEART RATE: 86 BPM | DIASTOLIC BLOOD PRESSURE: 68 MMHG | BODY MASS INDEX: 26.01 KG/M2 | TEMPERATURE: 98 F | HEIGHT: 63 IN | WEIGHT: 146.81 LBS | OXYGEN SATURATION: 99 % | SYSTOLIC BLOOD PRESSURE: 102 MMHG

## 2020-12-08 DIAGNOSIS — E28.2 PCOS (POLYCYSTIC OVARIAN SYNDROME): ICD-10-CM

## 2020-12-08 DIAGNOSIS — F32.4 MAJOR DEPRESSIVE DISORDER IN PARTIAL REMISSION, UNSPECIFIED WHETHER RECURRENT (HCC): ICD-10-CM

## 2020-12-08 DIAGNOSIS — G44.209 ACUTE NON INTRACTABLE TENSION-TYPE HEADACHE: Primary | ICD-10-CM

## 2020-12-08 PROCEDURE — 3078F DIAST BP <80 MM HG: CPT | Performed by: INTERNAL MEDICINE

## 2020-12-08 PROCEDURE — 99214 OFFICE O/P EST MOD 30 MIN: CPT | Performed by: INTERNAL MEDICINE

## 2020-12-08 PROCEDURE — 3008F BODY MASS INDEX DOCD: CPT | Performed by: INTERNAL MEDICINE

## 2020-12-08 PROCEDURE — 3074F SYST BP LT 130 MM HG: CPT | Performed by: INTERNAL MEDICINE

## 2020-12-08 RX ORDER — TRETINOIN 0.025 %
CREAM (GRAM) TOPICAL
COMMUNITY
Start: 2020-07-07 | End: 2021-05-28

## 2020-12-08 RX ORDER — BUTALBITAL, ACETAMINOPHEN AND CAFFEINE 50; 325; 40 MG/1; MG/1; MG/1
1-2 TABLET ORAL EVERY 4 HOURS PRN
Qty: 20 TABLET | Refills: 2 | Status: SHIPPED | OUTPATIENT
Start: 2020-12-08 | End: 2021-02-05

## 2020-12-08 RX ORDER — CLOBETASOL PROPIONATE 0.5 MG/G
OINTMENT TOPICAL
COMMUNITY
Start: 2020-11-03 | End: 2021-02-05

## 2020-12-08 RX ORDER — HYDROQUINONE 40 MG/G
CREAM TOPICAL
COMMUNITY
Start: 2020-07-07 | End: 2021-03-07 | Stop reason: ALTCHOICE

## 2020-12-08 RX ORDER — DULOXETIN HYDROCHLORIDE 60 MG/1
60 CAPSULE, DELAYED RELEASE ORAL DAILY
Qty: 90 CAPSULE | Refills: 1 | Status: SHIPPED | OUTPATIENT
Start: 2020-12-08 | End: 2021-02-01

## 2020-12-08 NOTE — PROGRESS NOTES
HPI:   Nicole Terrell is a 37year old female who presents for complains of: Patient presents with:  Headache: random headaches, weekly. now for 1 month has increased, 1-2 per week.  middle for forhead, sensative to sound and light, happening random ti next few weeks as we get the first snowfall.     2. Major depressive disorder in partial remission, unspecified whether recurrent ([facility].)  As above continue current BuSpar at current dosage, realize an afternoon dosage is okay every once in a while as needed

## 2020-12-08 NOTE — TELEPHONE ENCOUNTER
LM on patient's cell (OK per HIPAA) and relayed MD message. Advised in VM patient call back to schedule appt. TO FD: please call patient in the morning to see if she can be scheduled with Dr. Keshia Lubin. Thanks!

## 2020-12-08 NOTE — PATIENT INSTRUCTIONS
1. Acute non intractable tension-type headache  - Butalbital-APAP-Caffeine -40 MG Oral Tab; Take 1-2 tablets by mouth every 4 (four) hours as needed for Headaches. Dispense: 20 tablet;  Refill: 2  Lets try some new headache medication for the onset o

## 2021-02-01 ENCOUNTER — PATIENT MESSAGE (OUTPATIENT)
Dept: INTERNAL MEDICINE CLINIC | Facility: CLINIC | Age: 36
End: 2021-02-01

## 2021-02-01 ENCOUNTER — LAB ENCOUNTER (OUTPATIENT)
Dept: LAB | Age: 36
End: 2021-02-01
Attending: INTERNAL MEDICINE
Payer: COMMERCIAL

## 2021-02-01 ENCOUNTER — TELEPHONE (OUTPATIENT)
Dept: INTERNAL MEDICINE CLINIC | Facility: CLINIC | Age: 36
End: 2021-02-01

## 2021-02-01 ENCOUNTER — OFFICE VISIT (OUTPATIENT)
Dept: INTERNAL MEDICINE CLINIC | Facility: CLINIC | Age: 36
End: 2021-02-01
Payer: COMMERCIAL

## 2021-02-01 VITALS
TEMPERATURE: 99 F | DIASTOLIC BLOOD PRESSURE: 74 MMHG | SYSTOLIC BLOOD PRESSURE: 110 MMHG | HEIGHT: 63 IN | OXYGEN SATURATION: 97 % | WEIGHT: 155 LBS | HEART RATE: 105 BPM | BODY MASS INDEX: 27.46 KG/M2

## 2021-02-01 DIAGNOSIS — M12.9 ARTHROPATHY: ICD-10-CM

## 2021-02-01 DIAGNOSIS — R21 RASH: Primary | ICD-10-CM

## 2021-02-01 DIAGNOSIS — L70.9 ACNE, UNSPECIFIED ACNE TYPE: ICD-10-CM

## 2021-02-01 DIAGNOSIS — R21 RASH: ICD-10-CM

## 2021-02-01 DIAGNOSIS — R51.9 NONINTRACTABLE EPISODIC HEADACHE, UNSPECIFIED HEADACHE TYPE: ICD-10-CM

## 2021-02-01 LAB
ALBUMIN SERPL-MCNC: 4.2 G/DL (ref 3.4–5)
ALBUMIN/GLOB SERPL: 1.1 {RATIO} (ref 1–2)
ALP LIVER SERPL-CCNC: 88 U/L
ALT SERPL-CCNC: 57 U/L
ANION GAP SERPL CALC-SCNC: 4 MMOL/L (ref 0–18)
AST SERPL-CCNC: 17 U/L (ref 15–37)
BASOPHILS # BLD AUTO: 0.08 X10(3) UL (ref 0–0.2)
BASOPHILS NFR BLD AUTO: 0.9 %
BILIRUB SERPL-MCNC: 0.2 MG/DL (ref 0.1–2)
BUN BLD-MCNC: 20 MG/DL (ref 7–18)
BUN/CREAT SERPL: 28.2 (ref 10–20)
CALCIUM BLD-MCNC: 9.3 MG/DL (ref 8.5–10.1)
CHLORIDE SERPL-SCNC: 104 MMOL/L (ref 98–112)
CO2 SERPL-SCNC: 29 MMOL/L (ref 21–32)
CREAT BLD-MCNC: 0.71 MG/DL
CRP SERPL-MCNC: <0.29 MG/DL (ref ?–0.3)
DEPRECATED RDW RBC AUTO: 40.6 FL (ref 35.1–46.3)
EOSINOPHIL # BLD AUTO: 0.34 X10(3) UL (ref 0–0.7)
EOSINOPHIL NFR BLD AUTO: 3.8 %
ERYTHROCYTE [DISTWIDTH] IN BLOOD BY AUTOMATED COUNT: 11.6 % (ref 11–15)
ERYTHROCYTE [SEDIMENTATION RATE] IN BLOOD: 8 MM/HR
GLOBULIN PLAS-MCNC: 3.7 G/DL (ref 2.8–4.4)
GLUCOSE BLD-MCNC: 94 MG/DL (ref 70–99)
HCT VFR BLD AUTO: 41.2 %
HGB BLD-MCNC: 13.7 G/DL
IMM GRANULOCYTES # BLD AUTO: 0.03 X10(3) UL (ref 0–1)
IMM GRANULOCYTES NFR BLD: 0.3 %
LYMPHOCYTES # BLD AUTO: 2.94 X10(3) UL (ref 1–4)
LYMPHOCYTES NFR BLD AUTO: 32.6 %
M PROTEIN MFR SERPL ELPH: 7.9 G/DL (ref 6.4–8.2)
MCH RBC QN AUTO: 31.7 PG (ref 26–34)
MCHC RBC AUTO-ENTMCNC: 33.3 G/DL (ref 31–37)
MCV RBC AUTO: 95.4 FL
MONOCYTES # BLD AUTO: 0.78 X10(3) UL (ref 0.1–1)
MONOCYTES NFR BLD AUTO: 8.6 %
NEUTROPHILS # BLD AUTO: 4.86 X10 (3) UL (ref 1.5–7.7)
NEUTROPHILS # BLD AUTO: 4.86 X10(3) UL (ref 1.5–7.7)
NEUTROPHILS NFR BLD AUTO: 53.8 %
OSMOLALITY SERPL CALC.SUM OF ELEC: 286 MOSM/KG (ref 275–295)
PATIENT FASTING Y/N/NP: NO
PLATELET # BLD AUTO: 382 10(3)UL (ref 150–450)
POTASSIUM SERPL-SCNC: 4.5 MMOL/L (ref 3.5–5.1)
RBC # BLD AUTO: 4.32 X10(6)UL
RHEUMATOID FACT SERPL-ACNC: <10 IU/ML (ref ?–15)
SODIUM SERPL-SCNC: 137 MMOL/L (ref 136–145)
TSI SER-ACNC: 1.59 MIU/ML (ref 0.36–3.74)
WBC # BLD AUTO: 9 X10(3) UL (ref 4–11)

## 2021-02-01 PROCEDURE — 86140 C-REACTIVE PROTEIN: CPT

## 2021-02-01 PROCEDURE — 36415 COLL VENOUS BLD VENIPUNCTURE: CPT

## 2021-02-01 PROCEDURE — 83876 ASSAY MYELOPEROXIDASE: CPT

## 2021-02-01 PROCEDURE — 80053 COMPREHEN METABOLIC PANEL: CPT

## 2021-02-01 PROCEDURE — 85025 COMPLETE CBC W/AUTO DIFF WBC: CPT

## 2021-02-01 PROCEDURE — 3078F DIAST BP <80 MM HG: CPT | Performed by: INTERNAL MEDICINE

## 2021-02-01 PROCEDURE — 86235 NUCLEAR ANTIGEN ANTIBODY: CPT

## 2021-02-01 PROCEDURE — 99214 OFFICE O/P EST MOD 30 MIN: CPT | Performed by: INTERNAL MEDICINE

## 2021-02-01 PROCEDURE — 86038 ANTINUCLEAR ANTIBODIES: CPT

## 2021-02-01 PROCEDURE — 84443 ASSAY THYROID STIM HORMONE: CPT

## 2021-02-01 PROCEDURE — 3008F BODY MASS INDEX DOCD: CPT | Performed by: INTERNAL MEDICINE

## 2021-02-01 PROCEDURE — 86200 CCP ANTIBODY: CPT

## 2021-02-01 PROCEDURE — 86431 RHEUMATOID FACTOR QUANT: CPT

## 2021-02-01 PROCEDURE — 83516 IMMUNOASSAY NONANTIBODY: CPT

## 2021-02-01 PROCEDURE — 3074F SYST BP LT 130 MM HG: CPT | Performed by: INTERNAL MEDICINE

## 2021-02-01 PROCEDURE — 86255 FLUORESCENT ANTIBODY SCREEN: CPT

## 2021-02-01 PROCEDURE — 85652 RBC SED RATE AUTOMATED: CPT

## 2021-02-01 RX ORDER — TRIAMCINOLONE ACETONIDE 5 MG/G
1 CREAM TOPICAL 2 TIMES DAILY PRN
Qty: 15 G | Refills: 1 | Status: SHIPPED | OUTPATIENT
Start: 2021-02-01 | End: 2021-03-07 | Stop reason: ALTCHOICE

## 2021-02-01 RX ORDER — DULOXETIN HYDROCHLORIDE 30 MG/1
30 CAPSULE, DELAYED RELEASE ORAL DAILY
Qty: 30 CAPSULE | Refills: 1 | Status: SHIPPED | OUTPATIENT
Start: 2021-02-01 | End: 2021-04-12 | Stop reason: ALTCHOICE

## 2021-02-01 RX ORDER — BUSPIRONE HYDROCHLORIDE 5 MG/1
5 TABLET ORAL 2 TIMES DAILY
Qty: 60 TABLET | Refills: 1 | Status: SHIPPED | OUTPATIENT
Start: 2021-02-01 | End: 2021-04-13

## 2021-02-01 NOTE — PROGRESS NOTES
Gayatri Gardners is a 37year old female. HPI:   Patient presents with:  Derm Problem: discoloation bilateral arms- saw dermatologist, also saw GYN  Rash: bilateral hands -painful  Wrist Pain: left wrist  Headache: got RX from DR. FORDE - not helping Disorder Mother    • Stroke Neg       Social History: Social History    Tobacco Use      Smoking status: Former Smoker        Quit date: 2010        Years since quittin.0      Smokeless tobacco: Never Used    Alcohol use:  Yes      Alcohol/week: 0. height 5' 3\" (1.6 m), weight 155 lb (70.3 kg), SpO2 97 %, not currently breastfeeding.   Constitutional: alert and oriented x3 in no acute distress  HEENT- EOMI, PERRL  Nose/Mouth/Throat: pharynx without erythema; no oral lesions  Neck/Thyroid: neck supple 30 minutes obtaining history, evaluating patient, discussing treatment options, diet, exercise, review of available labs and radiology reports, and completing documentation.             Orders This Visit:  No orders of the defined types were placed in this

## 2021-02-01 NOTE — TELEPHONE ENCOUNTER
I spoke with patient. Her arm looks the same. Her skin is red and there are little dots around where the adhesive was. She has never had this before. She wonders if there is some latex in the bandaid.  She has little red dots and is concerned this is a rash

## 2021-02-01 NOTE — TELEPHONE ENCOUNTER
Pt. States the red bumps on her hand are really red and going up her arm she isn't sure if the bumps are elevated her right hand is red and blotchy and is covering her fingers her left hand is red on her actual hand and her fingers almost covering her hand

## 2021-02-01 NOTE — TELEPHONE ENCOUNTER
Patient saw DR Kim Oglesby today and was informed to go right down stairs and get her blood work done. Patient did this and took off the band aid when she got home. Patient is now calling with red bumps on her arm where the band aid was.  Patient is hoping the

## 2021-02-02 LAB
CCP IGG SERPL-ACNC: 1.2 U/ML (ref 0–6.9)
ENA SM+RNP AB SER QL: NEGATIVE

## 2021-02-02 NOTE — TELEPHONE ENCOUNTER
Spoke to patient and relayed MD message. Patient verbalized understanding. Pt requesting Dr. Hillary Baez to please advise on labs ordered by Dr. Elizabeth Montana at [place] [id number].

## 2021-02-02 NOTE — TELEPHONE ENCOUNTER
Looks like an eczema/dry process, possibly reaction to adhesive, or stress.   I recommend we use a cortisone ointment of sorts, nursing you can feel free to let her know when you call her tomorrow that I sent in triamcinolone 0.5% cream for her to her pharm

## 2021-02-03 NOTE — TELEPHONE ENCOUNTER
To Dr. Paddy Carcamo---    Pt requesting lab results from 2/1. RN did not note message from Dr. Lily Iverson in regards to lab results note. Are you able to advise?

## 2021-02-03 NOTE — TELEPHONE ENCOUNTER
Pt called again for results of labs taken on Monday  Pt hoping to hear back today  Tasked to nursing

## 2021-02-04 LAB — NUCLEAR IGG TITR SER IF: NEGATIVE {TITER}

## 2021-02-05 ENCOUNTER — TELEPHONE (OUTPATIENT)
Dept: INTERNAL MEDICINE CLINIC | Facility: CLINIC | Age: 36
End: 2021-02-05

## 2021-02-05 NOTE — TELEPHONE ENCOUNTER
Patients Lab work was faxed to [facility]) and confirmation received.

## 2021-02-06 LAB
MYELOPEROX ANTIBODIES, IGG: 0 AU/ML
SERINE PROTEASE3, IGG: 1 AU/ML

## 2021-03-07 ENCOUNTER — HOSPITAL ENCOUNTER (OUTPATIENT)
Age: 36
Discharge: HOME | End: 2021-03-07
Payer: COMMERCIAL

## 2021-03-07 VITALS
TEMPERATURE: 97 F | DIASTOLIC BLOOD PRESSURE: 73 MMHG | HEART RATE: 92 BPM | RESPIRATION RATE: 18 BRPM | OXYGEN SATURATION: 96 % | SYSTOLIC BLOOD PRESSURE: 112 MMHG

## 2021-03-07 DIAGNOSIS — J06.9 VIRAL URI: Primary | ICD-10-CM

## 2021-03-07 LAB
POCT RAPID STREP: NEGATIVE
SARS-COV-2 RNA RESP QL NAA+PROBE: NOT DETECTED

## 2021-03-07 PROCEDURE — 87081 CULTURE SCREEN ONLY: CPT | Performed by: NURSE PRACTITIONER

## 2021-03-07 PROCEDURE — 99203 OFFICE O/P NEW LOW 30 MIN: CPT

## 2021-03-07 PROCEDURE — 87880 STREP A ASSAY W/OPTIC: CPT | Performed by: NURSE PRACTITIONER

## 2021-03-07 PROCEDURE — 99214 OFFICE O/P EST MOD 30 MIN: CPT

## 2021-04-12 ENCOUNTER — TELEPHONE (OUTPATIENT)
Dept: INTERNAL MEDICINE CLINIC | Facility: CLINIC | Age: 36
End: 2021-04-12

## 2021-04-12 ENCOUNTER — OFFICE VISIT (OUTPATIENT)
Dept: INTERNAL MEDICINE CLINIC | Facility: CLINIC | Age: 36
End: 2021-04-12
Payer: COMMERCIAL

## 2021-04-12 VITALS
WEIGHT: 156 LBS | TEMPERATURE: 98 F | HEART RATE: 97 BPM | OXYGEN SATURATION: 99 % | DIASTOLIC BLOOD PRESSURE: 66 MMHG | SYSTOLIC BLOOD PRESSURE: 110 MMHG | RESPIRATION RATE: 14 BRPM | BODY MASS INDEX: 31 KG/M2

## 2021-04-12 DIAGNOSIS — R14.0 ABDOMINAL BLOATING: Primary | ICD-10-CM

## 2021-04-12 DIAGNOSIS — R63.5 WEIGHT GAIN: ICD-10-CM

## 2021-04-12 DIAGNOSIS — E28.2 PCOS (POLYCYSTIC OVARIAN SYNDROME): ICD-10-CM

## 2021-04-12 DIAGNOSIS — Z83.79 FAMILY HISTORY OF CELIAC DISEASE: ICD-10-CM

## 2021-04-12 DIAGNOSIS — Z30.09 BIRTH CONTROL COUNSELING: ICD-10-CM

## 2021-04-12 DIAGNOSIS — R21 RASH: ICD-10-CM

## 2021-04-12 PROCEDURE — 99214 OFFICE O/P EST MOD 30 MIN: CPT | Performed by: INTERNAL MEDICINE

## 2021-04-12 PROCEDURE — 3078F DIAST BP <80 MM HG: CPT | Performed by: INTERNAL MEDICINE

## 2021-04-12 PROCEDURE — 3074F SYST BP LT 130 MM HG: CPT | Performed by: INTERNAL MEDICINE

## 2021-04-12 RX ORDER — HYDROCHLOROTHIAZIDE 25 MG/1
25 TABLET ORAL DAILY
Qty: 30 TABLET | Refills: 1 | Status: SHIPPED | OUTPATIENT
Start: 2021-04-12 | End: 2021-06-11 | Stop reason: ALTCHOICE

## 2021-04-12 NOTE — PROGRESS NOTES
HPI:   Sherren [place] is a 37year old female who presents for complains of: Patient presents with:  Bloating: Nexplanon was removed on 3/1/21 by OB/GYN as pt was experiencing arm discoloration.  Pt reports she has not had her menses since 3/1/21, but h couple weeks  I do like the idea of continuing with the exercise, other dietary measures as well    2. Weight gain  Mild diuretics along with the Aldactone  - hydrochlorothiazide 25 MG Oral Tab; Take 1 tablet (25 mg total) by mouth daily.   Dispense: 30 tab

## 2021-04-12 NOTE — PATIENT INSTRUCTIONS
1. Abdominal bloating  I like the idea of the avoidance of gluten for now, I am suspecting that you have some system inflammation here, could very well be from celiac disease, also lactulose may be the next question there  I do like the idea of probiotic a

## 2021-04-12 NOTE — TELEPHONE ENCOUNTER
Reviewed AVS after todays visit    Pt isn't taking anything for her PCOS   & Has questions re: Instruction #3    3.  PCOS (polycystic ovarian syndrome)  Continue the Aldactone    Available today until 7p or tomorrow     [phone]

## 2021-04-12 NOTE — TELEPHONE ENCOUNTER
Yes, from what I understand Aldactone is a treatment for PCOS (Metformin, birth control suppression are as well)

## 2021-04-13 NOTE — TELEPHONE ENCOUNTER
To Dr. Kian Schofield - see below -   Pt states she is having these migraines every day, sometimed twice daily. Occ wake up in the middle of the night with migraine.   Does have butia/acetaminophen/caffeine - pt states med makes pt very sleepy, has 2 yr  Has trichad

## 2021-04-13 NOTE — TELEPHONE ENCOUNTER
Patient is calling she forgot to mention at her appointment yesterday that she has been experiencing Migraines on and off for 1 week  She is experiencing nausea along with the headache, she is tired    She said she has been taking ibuprofen is not helping

## 2021-04-13 NOTE — TELEPHONE ENCOUNTER
Patient called and relayed Dr Cara Callaway message. Patient verbalized understanding with and clarified Aldactone is her Spironolactone that she is taking.

## 2021-04-13 NOTE — TELEPHONE ENCOUNTER
Nursing, try to call her, let her know this statement \"based on her conversation with me yesterday, and some of the phone call after and the calls today, it seems to me that something is out of range with her mood, her anxiety is almost seeming to me to m

## 2021-04-13 NOTE — TELEPHONE ENCOUNTER
Virtual Visit/Telephone Note    Shiraz Oliverosestelita verbally consents to a Virtual/Telephone Check-In service on 04/07/20.   Patient understands and accepts financial responsibility for any deductible, co-insurance and/or co-pays associated with this service sent in new prescriptions to the pharmacy, you are welcome to use up what you have her medications but it being capsules you will have to take 2 different medications to get to this dosage, I also like the idea of you possibly increasing the BuSpar to 3 ti day, this goes better in the morning, will help with a few pounds of water weight gain    5.  Other social stressor  Continue with marriage counseling, my nurses here in the office like Veronique from a counseling standpoint to help you through what is happen

## 2021-04-14 ENCOUNTER — PATIENT MESSAGE (OUTPATIENT)
Dept: INTERNAL MEDICINE CLINIC | Facility: CLINIC | Age: 36
End: 2021-04-14

## 2021-04-14 NOTE — TELEPHONE ENCOUNTER
From: Kimi Landers  To: Shahriar Castillo DO  Sent: 4/14/2021 5:02 PM CDT  Subject: Other    Hello  I have made my appointment with Alex on May 11th, I was hoping to get in somewhere sooner. Can I go to someone else?  Also if I do how will you get

## 2021-04-14 NOTE — TELEPHONE ENCOUNTER
Called patient regarding message - patient states she thought the message goes to DR. FORDE directly - she will send another messag eso this one can be ignored

## 2021-04-15 ENCOUNTER — TELEPHONE (OUTPATIENT)
Dept: INTERNAL MEDICINE CLINIC | Facility: CLINIC | Age: 36
End: 2021-04-15

## 2021-04-15 NOTE — TELEPHONE ENCOUNTER
Hello  I have made my appointment with Alex on May 11th, I was hoping to get in somewhere sooner. Can I go to someone else? Also if I do how will you get the info if they don't use Epic? Pt.  Does have someone else she can see but Dr. Winston Puckett in [facility]

## 2021-04-15 NOTE — TELEPHONE ENCOUNTER
Spoke to patient that confirmed that any practitioner that sees the patient can fax OV notes to [facility] at 21 [phone]. Pt verbalized understanding.  Triage RN called Tawanda montalvo to see if pt can be seen sooner, this RN placed on hold for 8+ nerissa

## 2021-04-16 NOTE — TELEPHONE ENCOUNTER
Noted see staff message between myself and Alex, hopefully they can fit her in sooner, I did send the patient a MyChart message

## 2021-05-07 ENCOUNTER — TELEPHONE (OUTPATIENT)
Dept: INTERNAL MEDICINE CLINIC | Facility: CLINIC | Age: 36
End: 2021-05-07

## 2021-05-07 DIAGNOSIS — R21 RASH: Primary | ICD-10-CM

## 2021-05-07 NOTE — TELEPHONE ENCOUNTER
Pt. Was seen by her Gyne today and was told that the discoloration on her arm isn't caused by her birth control. She want to know if she should be seen sooner by Dr. Dave Stone or is she needs to see another specialist please advise ph.  # [phone]  Routed to cl

## 2021-05-07 NOTE — TELEPHONE ENCOUNTER
To Dr. Jones Ok you like to refer pt to Rheum for persistent rash on arms for over 1 year. Pt had nexplanon removed 3/1/21. Pt had OV with Dr. Elton Womack today, please see OV note for further documentation.

## 2021-05-07 NOTE — TELEPHONE ENCOUNTER
I recommend she sees dermatology first, specialist dermatologist that hopefully can get her in. He is not taking any new patients, but she should let them know when she calls that I referred specifically to him, Dr. Patti Kilpatrick. See referral below.

## 2021-05-17 ENCOUNTER — IMMUNIZATION (OUTPATIENT)
Dept: LAB | Age: 36
End: 2021-05-17

## 2021-05-17 DIAGNOSIS — Z23 NEED FOR VACCINATION: Primary | ICD-10-CM

## 2021-05-17 PROCEDURE — 91300 COVID 19 PFIZER-BIONTECH: CPT

## 2021-05-17 PROCEDURE — 0001A COVID 19 PFIZER-BIONTECH: CPT

## 2021-05-20 ENCOUNTER — LAB ENCOUNTER (OUTPATIENT)
Dept: LAB | Age: 36
End: 2021-05-20
Attending: DERMATOLOGY
Payer: COMMERCIAL

## 2021-05-20 DIAGNOSIS — L30.9 DERMATITIS: Primary | ICD-10-CM

## 2021-05-20 PROCEDURE — 86235 NUCLEAR ANTIGEN ANTIBODY: CPT

## 2021-05-20 PROCEDURE — 36415 COLL VENOUS BLD VENIPUNCTURE: CPT

## 2021-05-20 PROCEDURE — 86160 COMPLEMENT ANTIGEN: CPT

## 2021-05-20 PROCEDURE — 86162 COMPLEMENT TOTAL (CH50): CPT

## 2021-05-20 PROCEDURE — 86038 ANTINUCLEAR ANTIBODIES: CPT

## 2021-05-26 ENCOUNTER — HOSPITAL ENCOUNTER (EMERGENCY)
Facility: HOSPITAL | Age: 36
Discharge: HOME | End: 2021-05-26
Attending: EMERGENCY MEDICINE
Payer: COMMERCIAL

## 2021-05-26 VITALS
HEIGHT: 59 IN | WEIGHT: 145 LBS | OXYGEN SATURATION: 99 % | TEMPERATURE: 99 F | DIASTOLIC BLOOD PRESSURE: 88 MMHG | SYSTOLIC BLOOD PRESSURE: 124 MMHG | RESPIRATION RATE: 18 BRPM | HEART RATE: 88 BPM | BODY MASS INDEX: 29.23 KG/M2

## 2021-05-26 DIAGNOSIS — E87.6 HYPOKALEMIA: ICD-10-CM

## 2021-05-26 DIAGNOSIS — R20.2 PARESTHESIAS: ICD-10-CM

## 2021-05-26 DIAGNOSIS — M79.10 MYALGIA: Primary | ICD-10-CM

## 2021-05-26 PROCEDURE — 81001 URINALYSIS AUTO W/SCOPE: CPT | Performed by: EMERGENCY MEDICINE

## 2021-05-26 PROCEDURE — 99283 EMERGENCY DEPT VISIT LOW MDM: CPT

## 2021-05-26 PROCEDURE — 36415 COLL VENOUS BLD VENIPUNCTURE: CPT

## 2021-05-26 PROCEDURE — 83735 ASSAY OF MAGNESIUM: CPT | Performed by: EMERGENCY MEDICINE

## 2021-05-26 PROCEDURE — 81025 URINE PREGNANCY TEST: CPT

## 2021-05-26 PROCEDURE — 85025 COMPLETE CBC W/AUTO DIFF WBC: CPT | Performed by: EMERGENCY MEDICINE

## 2021-05-26 PROCEDURE — 82550 ASSAY OF CK (CPK): CPT | Performed by: EMERGENCY MEDICINE

## 2021-05-26 PROCEDURE — 80048 BASIC METABOLIC PNL TOTAL CA: CPT | Performed by: EMERGENCY MEDICINE

## 2021-05-26 RX ORDER — POTASSIUM CHLORIDE 20 MEQ/1
40 TABLET, EXTENDED RELEASE ORAL ONCE
Status: COMPLETED | OUTPATIENT
Start: 2021-05-26 | End: 2021-05-26

## 2021-05-26 NOTE — ED INITIAL ASSESSMENT (HPI)
PT TO ED WITH C/O TINGLING IN ALL EXTEMITIES, BIOPSY DONE OF MARTINA ARMS FOR DISCOLORATION; NO RESULTS AVAILABLE, TINGLING STARTED AFTER THAT. DENIES FEVER OR DISCHARGE FROM THE SITES. UNABLE TO SLEEP, + FATIGUE. + NECK AND LOWER BACK PAIN.  DENIES BACK OR NEC

## 2021-05-27 ENCOUNTER — TELEPHONE (OUTPATIENT)
Dept: RHEUMATOLOGY | Facility: CLINIC | Age: 36
End: 2021-05-27

## 2021-05-27 ENCOUNTER — LAB ENCOUNTER (OUTPATIENT)
Dept: LAB | Age: 36
End: 2021-05-27
Attending: INTERNAL MEDICINE
Payer: COMMERCIAL

## 2021-05-27 ENCOUNTER — OFFICE VISIT (OUTPATIENT)
Dept: RHEUMATOLOGY | Facility: CLINIC | Age: 36
End: 2021-05-27
Payer: COMMERCIAL

## 2021-05-27 VITALS
HEART RATE: 79 BPM | DIASTOLIC BLOOD PRESSURE: 74 MMHG | BODY MASS INDEX: 31.45 KG/M2 | WEIGHT: 156 LBS | HEIGHT: 59 IN | SYSTOLIC BLOOD PRESSURE: 110 MMHG

## 2021-05-27 DIAGNOSIS — M79.10 MYALGIA: ICD-10-CM

## 2021-05-27 DIAGNOSIS — R20.2 NUMBNESS AND TINGLING OF BOTH LOWER EXTREMITIES: ICD-10-CM

## 2021-05-27 DIAGNOSIS — M54.50 ACUTE MIDLINE LOW BACK PAIN WITHOUT SCIATICA: ICD-10-CM

## 2021-05-27 DIAGNOSIS — R21 RASH: ICD-10-CM

## 2021-05-27 DIAGNOSIS — R20.0 NUMBNESS AND TINGLING OF BOTH LOWER EXTREMITIES: ICD-10-CM

## 2021-05-27 DIAGNOSIS — M25.50 POLYARTHRALGIA: Primary | ICD-10-CM

## 2021-05-27 DIAGNOSIS — M25.50 POLYARTHRALGIA: ICD-10-CM

## 2021-05-27 DIAGNOSIS — R51.9 INTRACTABLE EPISODIC HEADACHE, UNSPECIFIED HEADACHE TYPE: ICD-10-CM

## 2021-05-27 PROCEDURE — 36415 COLL VENOUS BLD VENIPUNCTURE: CPT

## 2021-05-27 PROCEDURE — 85613 RUSSELL VIPER VENOM DILUTED: CPT

## 2021-05-27 PROCEDURE — 99244 OFF/OP CNSLTJ NEW/EST MOD 40: CPT | Performed by: INTERNAL MEDICINE

## 2021-05-27 PROCEDURE — 83615 LACTATE (LD) (LDH) ENZYME: CPT

## 2021-05-27 PROCEDURE — 85598 HEXAGNAL PHOSPH PLTLT NEUTRL: CPT

## 2021-05-27 PROCEDURE — 85652 RBC SED RATE AUTOMATED: CPT

## 2021-05-27 PROCEDURE — 96372 THER/PROPH/DIAG INJ SC/IM: CPT | Performed by: INTERNAL MEDICINE

## 2021-05-27 PROCEDURE — 82595 ASSAY OF CRYOGLOBULIN: CPT

## 2021-05-27 PROCEDURE — 3008F BODY MASS INDEX DOCD: CPT | Performed by: INTERNAL MEDICINE

## 2021-05-27 PROCEDURE — 86146 BETA-2 GLYCOPROTEIN ANTIBODY: CPT

## 2021-05-27 PROCEDURE — 85730 THROMBOPLASTIN TIME PARTIAL: CPT

## 2021-05-27 PROCEDURE — 82085 ASSAY OF ALDOLASE: CPT

## 2021-05-27 PROCEDURE — 85390 FIBRINOLYSINS SCREEN I&R: CPT

## 2021-05-27 PROCEDURE — 86140 C-REACTIVE PROTEIN: CPT

## 2021-05-27 PROCEDURE — 3074F SYST BP LT 130 MM HG: CPT | Performed by: INTERNAL MEDICINE

## 2021-05-27 PROCEDURE — 86812 HLA TYPING A B OR C: CPT

## 2021-05-27 PROCEDURE — 85610 PROTHROMBIN TIME: CPT

## 2021-05-27 PROCEDURE — 86147 CARDIOLIPIN ANTIBODY EA IG: CPT

## 2021-05-27 PROCEDURE — 3078F DIAST BP <80 MM HG: CPT | Performed by: INTERNAL MEDICINE

## 2021-05-27 RX ORDER — MELOXICAM 15 MG/1
15 TABLET ORAL DAILY
Qty: 30 TABLET | Refills: 1 | Status: SHIPPED | OUTPATIENT
Start: 2021-05-27 | End: 2021-05-28

## 2021-05-27 RX ORDER — KETOROLAC TROMETHAMINE 30 MG/ML
30 INJECTION, SOLUTION INTRAMUSCULAR; INTRAVENOUS ONCE
Status: COMPLETED | OUTPATIENT
Start: 2021-05-27 | End: 2021-05-27

## 2021-05-27 RX ADMIN — KETOROLAC TROMETHAMINE 30 MG: 30 INJECTION, SOLUTION INTRAMUSCULAR; INTRAVENOUS at 10:45:00

## 2021-05-27 NOTE — ED PROVIDER NOTES
Patient Seen in: [facility] Emergency Department      History   Patient presents with:  Pain    Stated Complaint: neck pain, leg pains, arm is numb when she moves a certain way    HPI/Subjective:   HPI    51-year-old female presents emergency room w Yes      Alcohol/week: 0.0 standard drinks      Comment: occ    Drug use: No             Review of Systems    Positive for stated complaint: neck pain, leg pains, arm is numb when she moves a certain way  Other systems are as noted in HPI.   Constitutional other components within normal limits   URINALYSIS WITH CULTURE REFLEX - Abnormal; Notable for the following components:    Squamous Epi.  Cells Few (*)     All other components within normal limits   CBC W/ DIFFERENTIAL - Abnormal; Notable for the followin [address] [place] [address] [address]  Call  choose option 1 for general neurology    DO Yanet Machado. [address] [id number] [phone]    In 2 days            Medications Prescribed:  Discharge [address] [place] [address]

## 2021-05-27 NOTE — PROGRESS NOTES
Theodore Bhandari is a 37year old female who presents for Patient presents with:  Leg Pain  Arm Pain  Fatigue  Joint Pain: feet fingers  . HPI:     I had the pleasure of seeing Theodore Bhandari on 5/27/2021 for evaluation.      She is shirley mon 35 ye covid test is negative   Vaccine was on 5/17/2021 -   This pain all started after the vaccine - a few days - later. Wt Readings from Last 2 Encounters:  05/27/21 : 156 lb (70.8 kg)  05/26/21 : 145 lb (65.8 kg)    Body mass index is 31.51 kg/m². Tobacco Use      Smoking status: Former Smoker        Quit date: 2010        Years since quittin.4      Smokeless tobacco: Never Used    Vaping Use      Vaping Use: Never used    Alcohol use:  Yes      Alcohol/week: 0.0 standard drinks      Comment over both biceps - not plaque ,macular,   Dry skin over 1-5th mcps over both hands  Right more than left   CVS: RRR, no murmurs  RS: CTAB, no crackles, no rhonchi tender over chest wall.    ABD: Soft Non tender, no HSM felt, BS positive  Joint exam:   Tende Urobilinogen Urine      <2.0 mg/dL <2.0   Nitrite Urine      Negative Negative   Leukocyte Esterase Urine      Negative Negative   WBC Urine      0 - 5 /HPF 1-5   RBC Urine      0 - 2 /HPF 0-2   Bacteria Urine      None Seen /HPF None Seen   SQUAM EPI CE Pain  Fatigue  Joint Pain: feet fingers    1.  B/l arm rash, polyarthralgia, myalgia, with weakness and extreme tingling and numbness   - check labs to rule out myositis/dermatomyositis,   Seems like a flare up post covid vaccine - was having polyarthralgia

## 2021-05-27 NOTE — TELEPHONE ENCOUNTER
Will need PA for mri brain and c spine and MRA head and neck -   She will get this at [facility]

## 2021-05-27 NOTE — PATIENT INSTRUCTIONS
Summary:  1. Check labs   2. Check MRI Brain and Cspine - with and without contrast -   3. Check b/l foot xrays - , and neck xraya -   4. Return to clinic in 2 weeks. 5. ketorlac 30mg im x 1 today   6. Take meloxicam 15mg ad ay   7.  Follow up with neurol

## 2021-05-28 ENCOUNTER — OFFICE VISIT (OUTPATIENT)
Dept: NEUROLOGY | Facility: CLINIC | Age: 36
End: 2021-05-28
Payer: COMMERCIAL

## 2021-05-28 ENCOUNTER — HOSPITAL ENCOUNTER (EMERGENCY)
Facility: HOSPITAL | Age: 36
Discharge: HOME | End: 2021-05-28
Attending: EMERGENCY MEDICINE
Payer: COMMERCIAL

## 2021-05-28 ENCOUNTER — TELEPHONE (OUTPATIENT)
Dept: RHEUMATOLOGY | Facility: CLINIC | Age: 36
End: 2021-05-28

## 2021-05-28 VITALS
WEIGHT: 153 LBS | DIASTOLIC BLOOD PRESSURE: 74 MMHG | BODY MASS INDEX: 31 KG/M2 | RESPIRATION RATE: 16 BRPM | SYSTOLIC BLOOD PRESSURE: 134 MMHG | HEART RATE: 76 BPM

## 2021-05-28 VITALS
BODY MASS INDEX: 30.84 KG/M2 | OXYGEN SATURATION: 100 % | HEART RATE: 80 BPM | DIASTOLIC BLOOD PRESSURE: 77 MMHG | WEIGHT: 153 LBS | SYSTOLIC BLOOD PRESSURE: 116 MMHG | RESPIRATION RATE: 18 BRPM | HEIGHT: 59 IN | TEMPERATURE: 98 F

## 2021-05-28 DIAGNOSIS — M79.609 PARESTHESIA AND PAIN OF EXTREMITY: ICD-10-CM

## 2021-05-28 DIAGNOSIS — R53.1 WEAKNESS GENERALIZED: ICD-10-CM

## 2021-05-28 DIAGNOSIS — M79.661 PAIN IN BOTH LOWER LEGS: ICD-10-CM

## 2021-05-28 DIAGNOSIS — M79.662 PAIN IN BOTH LOWER LEGS: ICD-10-CM

## 2021-05-28 DIAGNOSIS — R20.8 BURNING SENSATION OF FEET: ICD-10-CM

## 2021-05-28 DIAGNOSIS — R20.2 PARESTHESIA AND PAIN OF EXTREMITY: ICD-10-CM

## 2021-05-28 DIAGNOSIS — R21 RASH, SKIN: ICD-10-CM

## 2021-05-28 DIAGNOSIS — R20.2 PARESTHESIAS: Primary | ICD-10-CM

## 2021-05-28 DIAGNOSIS — G43.101 MIGRAINE WITH AURA AND WITH STATUS MIGRAINOSUS, NOT INTRACTABLE: Primary | ICD-10-CM

## 2021-05-28 PROCEDURE — 3078F DIAST BP <80 MM HG: CPT | Performed by: OTHER

## 2021-05-28 PROCEDURE — 3075F SYST BP GE 130 - 139MM HG: CPT | Performed by: OTHER

## 2021-05-28 PROCEDURE — 96374 THER/PROPH/DIAG INJ IV PUSH: CPT

## 2021-05-28 PROCEDURE — 99284 EMERGENCY DEPT VISIT MOD MDM: CPT

## 2021-05-28 PROCEDURE — 99204 OFFICE O/P NEW MOD 45 MIN: CPT | Performed by: OTHER

## 2021-05-28 RX ORDER — PREGABALIN 50 MG/1
50 CAPSULE ORAL 3 TIMES DAILY
Qty: 90 CAPSULE | Refills: 3 | Status: SHIPPED | OUTPATIENT
Start: 2021-05-28 | End: 2021-06-11

## 2021-05-28 RX ORDER — DOXYCYCLINE 100 MG/1
100 CAPSULE ORAL 2 TIMES DAILY
COMMUNITY
Start: 2021-05-21 | End: 2021-06-11

## 2021-05-28 RX ORDER — METHYLPREDNISOLONE SODIUM SUCCINATE 125 MG/2ML
60 INJECTION, POWDER, LYOPHILIZED, FOR SOLUTION INTRAMUSCULAR; INTRAVENOUS ONCE
Status: COMPLETED | OUTPATIENT
Start: 2021-05-28 | End: 2021-05-28

## 2021-05-28 RX ORDER — METHYLPREDNISOLONE 4 MG/1
TABLET ORAL
Qty: 1 EACH | Refills: 0 | Status: SHIPPED | OUTPATIENT
Start: 2021-05-28 | End: 2021-06-11

## 2021-05-28 RX ORDER — TOPIRAMATE 25 MG/1
TABLET ORAL
Qty: 60 TABLET | Refills: 3 | Status: SHIPPED | OUTPATIENT
Start: 2021-05-28 | End: 2021-06-08 | Stop reason: DRUGHIGH

## 2021-05-28 RX ORDER — SUMATRIPTAN 50 MG/1
50 TABLET, FILM COATED ORAL EVERY 2 HOUR PRN
Qty: 12 TABLET | Refills: 0 | Status: SHIPPED | OUTPATIENT
Start: 2021-05-28 | End: 2021-07-27 | Stop reason: ALTCHOICE

## 2021-05-28 NOTE — PROGRESS NOTES
[facility]   Neurology; INITIAL CLINIC VISIT  2021, 8:33 AM     Ange Townsend Patient Status:  No patient class for patient encounter    10/7/1985 MRN [id number]   Location [address] [place] [address] Termination of pregnancy (fetus) 2010    D&E at 21 wks gestation       PAST SURGICAL HISTORY:  Past Surgical History:   Procedure Laterality Date   •      • D & C  2010   • ISMA         FAMILY HISTORY:  family history includes Anxiety in capsule 1   • DULoxetine HCl 60 MG Oral Cap DR Particles Take 1 capsule (60 mg total) by mouth daily. Along with 30 mg to equal 90 mg daily 30 capsule 1   • hydrochlorothiazide 25 MG Oral Tab Take 1 tablet (25 mg total) by mouth daily.  30 tablet 1 voice is normal in volume, follow commends well;  Memory and comprehension:  MMSE is normal,   Cranial Nerves       CN II:  Visual fields full, Pupils equal and reactive, Fundi normal       CN III, IV, VI:  Horrizontal and vertical movements normal.  Daniel Vasquez status,   joint pain and skin rash may  from HA,     Neurological Exam today is normal today,      Work up included agreed to do MRI brain and cervical spine,   Labs reviewed,   Will add on B12 level,   Do EMG of all limbs,     Symptomatic treatme

## 2021-05-28 NOTE — TELEPHONE ENCOUNTER
Dr. Jhon Tee was considering steroids for her after her MRI's, can give it to her now due to her acute symptoms, will send perscription to pharmacy. She should stop the meloxicam on the steroids.  if her symptoms worsen I would recommend for her to go to

## 2021-05-28 NOTE — TELEPHONE ENCOUNTER
Spoke with the patient. She is having numbness and tingling in her legs and feet. Today when she woke up her feet were completely numb and fell after getting out of bed.  She saw the a neurologist today and they told her they believe that here issues are rh

## 2021-05-28 NOTE — TELEPHONE ENCOUNTER
Patient is calling and advising that her legs & Feet/Toes are in so much pain and they are numb and cold. She said the injections Dr. Reginald Frankel did yesterday di not help at all.      She fell yesterday when this started when she tried to get out of bed to

## 2021-05-28 NOTE — ED PROVIDER NOTES
Patient Seen in: [facility] AND [facility] Emergency Department      History   Patient presents with:  Numbness Weakness    Stated Complaint: numbness all over body x1wk     HPI/Subjective:   HPI    36 y/o female who states she has been having worsening numbness reviewed and negative except as noted above.     Physical Exam     ED Triage Vitals [05/28/21 1224]   /79   Pulse 83   Resp 20   Temp 97.7 °F (36.5 °C)   Temp src    SpO2 98 %   O2 Device None (Room air)       Current:/77   Pulse 80   Temp 97.7 2 days ago, rheumatology visit yesterday neurology visit today and was stating that she is not able to take care of herself. I strongly suggested and recommended the patient should be admitted to the hospital for further work-up.   She adamantly wants to g

## 2021-05-28 NOTE — ED INITIAL ASSESSMENT (HPI)
Pt reports numbness to bilateral arms and legs and states she feels like her whole body \"is a weight\".  Pt was seen at [facility] for same problem, was sent to follow up with neurologist and rheumatologist. Pt reports this has been going on and progressing

## 2021-05-28 NOTE — PROGRESS NOTES
Patient is having headaches and neck pain. Patient is having BUE and BLE numbness and tingling. Patient states sometimes she is unable to move, she has an increase in fatigue. Denies head trauma.  Patient states difficulty with memory recall and difficulty

## 2021-05-28 NOTE — CM/SW NOTE
outpatient MRI's scheduled for be 4th at 10:00am [facility]    instructions will be on AVS

## 2021-06-01 NOTE — TELEPHONE ENCOUNTER
Talked to ER - they wanted to admit her for pain control. She was not able to stay and MRI's moved up.    She received solumedrol 60mg iv x 1

## 2021-06-03 ENCOUNTER — TELEPHONE (OUTPATIENT)
Dept: RHEUMATOLOGY | Facility: CLINIC | Age: 36
End: 2021-06-03

## 2021-06-04 ENCOUNTER — OFFICE VISIT (OUTPATIENT)
Dept: RHEUMATOLOGY | Facility: CLINIC | Age: 36
End: 2021-06-04
Payer: COMMERCIAL

## 2021-06-04 ENCOUNTER — HOSPITAL ENCOUNTER (OUTPATIENT)
Dept: MRI IMAGING | Facility: HOSPITAL | Age: 36
Discharge: HOME | End: 2021-06-04
Attending: INTERNAL MEDICINE
Payer: COMMERCIAL

## 2021-06-04 ENCOUNTER — HOSPITAL ENCOUNTER (OUTPATIENT)
Dept: MRI IMAGING | Facility: HOSPITAL | Age: 36
End: 2021-06-04
Attending: INTERNAL MEDICINE
Payer: COMMERCIAL

## 2021-06-04 VITALS
HEART RATE: 106 BPM | DIASTOLIC BLOOD PRESSURE: 75 MMHG | SYSTOLIC BLOOD PRESSURE: 107 MMHG | WEIGHT: 153 LBS | HEIGHT: 59 IN | BODY MASS INDEX: 30.84 KG/M2 | RESPIRATION RATE: 16 BRPM

## 2021-06-04 DIAGNOSIS — R51.9 INTRACTABLE EPISODIC HEADACHE, UNSPECIFIED HEADACHE TYPE: ICD-10-CM

## 2021-06-04 DIAGNOSIS — R20.0 NUMBNESS AND TINGLING OF BOTH LOWER EXTREMITIES: ICD-10-CM

## 2021-06-04 DIAGNOSIS — R20.2 NUMBNESS AND TINGLING OF BOTH LOWER EXTREMITIES: ICD-10-CM

## 2021-06-04 DIAGNOSIS — M54.50 ACUTE MIDLINE LOW BACK PAIN WITHOUT SCIATICA: ICD-10-CM

## 2021-06-04 DIAGNOSIS — M25.50 POLYARTHRALGIA: Primary | ICD-10-CM

## 2021-06-04 DIAGNOSIS — M79.10 MYALGIA: ICD-10-CM

## 2021-06-04 PROCEDURE — 70553 MRI BRAIN STEM W/O & W/DYE: CPT | Performed by: INTERNAL MEDICINE

## 2021-06-04 PROCEDURE — 3008F BODY MASS INDEX DOCD: CPT | Performed by: INTERNAL MEDICINE

## 2021-06-04 PROCEDURE — 3078F DIAST BP <80 MM HG: CPT | Performed by: INTERNAL MEDICINE

## 2021-06-04 PROCEDURE — A9575 INJ GADOTERATE MEGLUMI 0.1ML: HCPCS | Performed by: INTERNAL MEDICINE

## 2021-06-04 PROCEDURE — 3074F SYST BP LT 130 MM HG: CPT | Performed by: INTERNAL MEDICINE

## 2021-06-04 PROCEDURE — 99214 OFFICE O/P EST MOD 30 MIN: CPT | Performed by: INTERNAL MEDICINE

## 2021-06-04 NOTE — PATIENT INSTRUCTIONS
1. Start lyrica 50mg at night - call in 2 weeks, can increase the dose prior to coming again  2. Check MR Cspine - with and without contrast - on Wednesday - eval for herniated disc   3. Check EMG/NCV studies   4.  Follow up in 1 month

## 2021-06-04 NOTE — PROGRESS NOTES
Prachi Chapman is a 37year old female who presents for Patient presents with:  Lab Results: MRI results, biopsy   Pain: all over body  Medication Follow-Up  . HPI:     I had the pleasure of seeing Prachi Chapman on 5/27/2021 for evaluation. several months.   covid test is negative   Vaccine was on 5/17/2021 -   This pain all started after the vaccine - a few days - later. 6/4/2021 -   She had gone to the ER on 5/28 for pain and was given solumderol 60mg iv x 1 and steorid bursts.    This by mouth daily. Along with 60 mg to equal 90mg daily 30 capsule 1   • DULoxetine HCl 60 MG Oral Cap DR Particles Take 1 capsule (60 mg total) by mouth daily.  Along with 30 mg to equal 90 mg daily 30 capsule 1   • hydrochlorothiazide 25 MG Oral Tab Take 1 t disease  RS: SOB, no Cough, slight Pleurtic pain,   GI: did feels  nausea, no vomiiting, no abominal pain, no hx of ulcer, no gastritis, no heartburn, no dyshpagia, no BRBPR or melena  : no dysuria, hx of miscarriages, one in the 2nd trimester , no DVT H 150.0 - 450.0 10(3)uL 414.0   MCV      80.0 - 100.0 fL 92.5   MCH      26.0 - 34.0 pg 31.8   MCHC      31.0 - 37.0 g/dL 34.4   RDW      11.0 - 15.0 % 11.0   RDW-SD      35.1 - 46.3 fL 37.8   Prelim Neutrophil Abs      1.50 - 7.70 x10 (3) uL 10.80 (H)   Kurt CALCULATED OSMOLALITY      275 - 295 mOsm/kg 285   eGFR NON-AFR.  AMERICAN      >=60 102   eGFR       >=60 117   CK      26 - 192 U/L 68   Magnesium, Serum      1.6 - 2.6 mg/dL 2.3     Component      Latest Ref Rng & Units 2/1/2021   SERIN Follow-Up    1.  B/l arm rash, polyarthralgia, myalgia, with weakness and extreme tingling and numbness   - check labs to rule out myositis/dermatomyositis,   Seems like a flare up post covid vaccine - was having polyarthralgia for a few months prior to the

## 2021-06-04 NOTE — TELEPHONE ENCOUNTER
Patient has MRI scheduled today and I do not see that this patient has gotten approval. I have no idea who scheduled it. Can you please look into this.

## 2021-06-05 ENCOUNTER — TELEPHONE (OUTPATIENT)
Dept: INTERNAL MEDICINE CLINIC | Facility: CLINIC | Age: 36
End: 2021-06-05

## 2021-06-05 ENCOUNTER — TELEPHONE (OUTPATIENT)
Dept: NEUROLOGY | Facility: CLINIC | Age: 36
End: 2021-06-05

## 2021-06-05 DIAGNOSIS — G43.101 MIGRAINE WITH AURA AND WITH STATUS MIGRAINOSUS, NOT INTRACTABLE: Primary | ICD-10-CM

## 2021-06-05 DIAGNOSIS — M79.609 PARESTHESIA AND PAIN OF EXTREMITY: ICD-10-CM

## 2021-06-05 DIAGNOSIS — R20.2 PARESTHESIA AND PAIN OF EXTREMITY: ICD-10-CM

## 2021-06-05 DIAGNOSIS — F41.9 ANXIETY: ICD-10-CM

## 2021-06-05 NOTE — TELEPHONE ENCOUNTER
Side effect from Lyrica which she took 50 mg TID    Hold off and restart tomorrow at 50 mg HS and call Dr Waynette Heimlich on further titration advise    Dr aMged Abbott

## 2021-06-07 RX ORDER — DULOXETIN HYDROCHLORIDE 30 MG/1
CAPSULE, DELAYED RELEASE ORAL
Qty: 30 CAPSULE | Refills: 5 | Status: SHIPPED | OUTPATIENT
Start: 2021-06-07 | End: 2021-08-06

## 2021-06-07 RX ORDER — DULOXETIN HYDROCHLORIDE 60 MG/1
60 CAPSULE, DELAYED RELEASE ORAL DAILY
Qty: 30 CAPSULE | Refills: 5 | Status: SHIPPED | OUTPATIENT
Start: 2021-06-07 | End: 2021-06-11 | Stop reason: ALTCHOICE

## 2021-06-07 RX ORDER — BUSPIRONE HYDROCHLORIDE 10 MG/1
TABLET ORAL
Qty: 90 TABLET | Refills: 5 | Status: SHIPPED | OUTPATIENT
Start: 2021-06-07 | End: 2021-06-11 | Stop reason: ALTCHOICE

## 2021-06-07 NOTE — TELEPHONE ENCOUNTER
Notified patient she may not get response from MD until tomorrow and patient verbalized understanding. Patient also mentioned she is having her EMG done at an outside facility as she can get it done sooner.

## 2021-06-08 ENCOUNTER — TELEPHONE (OUTPATIENT)
Dept: INTERNAL MEDICINE CLINIC | Facility: CLINIC | Age: 36
End: 2021-06-08

## 2021-06-08 ENCOUNTER — TELEPHONE (OUTPATIENT)
Dept: RHEUMATOLOGY | Facility: CLINIC | Age: 36
End: 2021-06-08

## 2021-06-08 DIAGNOSIS — F41.9 ANXIETY: ICD-10-CM

## 2021-06-08 RX ORDER — TOPIRAMATE 50 MG/1
50 TABLET, FILM COATED ORAL 2 TIMES DAILY
Qty: 60 TABLET | Refills: 0 | Status: SHIPPED | OUTPATIENT
Start: 2021-06-08 | End: 2021-06-11 | Stop reason: ALTCHOICE

## 2021-06-08 NOTE — TELEPHONE ENCOUNTER
This is not a quick conversation that can be had over the phone, or a decision that can be made in that manner over the phone, she will have to be seen. Normally if a specialist is referring her to the [facility] or [facility] they will direct her on how to do so not myself. Does not sound like a bad idea to be seen at 1 of those type of institutions, but normally the specialist will refer on if they cannot handle the case with our current means.

## 2021-06-08 NOTE — TELEPHONE ENCOUNTER
I called Ms. Gonzalez regarding her LE pains. She is on Lyrica and this was reduced to 50mg nightly only, She is having more pains. She has a prescription for Cymbalta but this is already at 90mg daily. She is unable to tolerate lyrica at higher doses.  We ondina

## 2021-06-08 NOTE — TELEPHONE ENCOUNTER
Spoke with patient and relayed Dr. Niall Ortega message to her. Patient verbalized understanding of Dr. Niall Ortega message and recommendations. Had a lengthy conversation with patient in which she recounted the numerous doctors she has seen, tests she has undergone, and her symptoms of peripheral numbness. She states she does not want to come in for OV, but she is willing to do so. However, patient seemed happy to have the appointment in place to sit down and to tell her \"long story\" and discuss her options with Dr. Harjeet Renee. Patient requests Dr. Harjeet Renee review her recent testing and visits. She especially would like help understanding EMG, which she sent today via [address] She had EMG done through [facility], as there was a long wait for EMG with [facility]. She states she hopes Dr. Harjeet Renee is prepared for a long visit with her. Appt scheduled for Friday at 12:30 pm.    To Dr. Candelario Vasquez.

## 2021-06-08 NOTE — TELEPHONE ENCOUNTER
MRI Spine     Good Morning Dr Bennett Draft,    MRI Spine that you ordered is still pending with Pts [facility] PPO plan since 6/1/21.   I reached out to [facility] today and spoke to RN, Jemal Spears, who advised that this order is not approved since not meeting guidelines for me

## 2021-06-08 NOTE — TELEPHONE ENCOUNTER
Pt states she will increase topamax. Per discussion with provider he states to increase topamax to 50 mg BID. RX sent per verbal order. Pt did not need call back about RX. But she is requesting call back when EMG results come in.

## 2021-06-08 NOTE — TELEPHONE ENCOUNTER
Patient is calling to discuss her recent EMG results from 6/8, she will forward the results through [address]  Will Dr D' Amico look at her chart and the multiple doctors she has seen    Dr Maggy Sinclair suggested she go out of state for care because the physicians she has seen has been unable to diagnose her  She is having symptoms that no one else can treat  Patient has seen a Neurologist, dermatologist for a rash on arms, rheumatologist   She is still in severe pain, she can't feel her lower back or right foot on the bottom    Dr Shara Leventhal told her to speak with Dr Geneva Luna and see if he will refer her to [facility] or [facility]     Patient would like to speak to Dr Geneva Luna today even without seeing the EMG results, she needs to know which direction she needs to go    Please call [phone]

## 2021-06-09 ENCOUNTER — TELEPHONE (OUTPATIENT)
Dept: NEUROLOGY | Facility: CLINIC | Age: 36
End: 2021-06-09

## 2021-06-09 ENCOUNTER — HOSPITAL ENCOUNTER (OUTPATIENT)
Dept: MRI IMAGING | Facility: HOSPITAL | Age: 36
Discharge: HOME | End: 2021-06-09
Attending: INTERNAL MEDICINE
Payer: COMMERCIAL

## 2021-06-09 DIAGNOSIS — R20.0 NUMBNESS AND TINGLING OF BOTH LOWER EXTREMITIES: ICD-10-CM

## 2021-06-09 DIAGNOSIS — R51.9 INTRACTABLE EPISODIC HEADACHE, UNSPECIFIED HEADACHE TYPE: ICD-10-CM

## 2021-06-09 DIAGNOSIS — R20.2 NUMBNESS AND TINGLING OF BOTH LOWER EXTREMITIES: ICD-10-CM

## 2021-06-09 PROCEDURE — 72156 MRI NECK SPINE W/O & W/DYE: CPT | Performed by: INTERNAL MEDICINE

## 2021-06-09 PROCEDURE — A9575 INJ GADOTERATE MEGLUMI 0.1ML: HCPCS | Performed by: INTERNAL MEDICINE

## 2021-06-09 NOTE — TELEPHONE ENCOUNTER
Did not need peer to peer   Called insurance and her MRI   C spine is approved   MRI C spine - 6/1/2021- 6/30/2021 -   Order # [id number]  Please let pt.  Know     Cancelled the MRA Brains that were ordered and possibly there was confusion b/c the MICHELE yang

## 2021-06-10 ENCOUNTER — PATIENT MESSAGE (OUTPATIENT)
Dept: INTERNAL MEDICINE CLINIC | Facility: CLINIC | Age: 36
End: 2021-06-10

## 2021-06-10 NOTE — TELEPHONE ENCOUNTER
Prior authorization approved for MRI from June 1 till June 30. Patient appears to have already gotten this on 06/04/2021 and 06/09/2021.

## 2021-06-11 RX ORDER — BUSPIRONE HYDROCHLORIDE 10 MG/1
TABLET ORAL
Qty: 90 TABLET | Refills: 0 | OUTPATIENT
Start: 2021-06-11

## 2021-06-11 NOTE — TELEPHONE ENCOUNTER
Current refill request refused due to refill is either a duplicate request or has active refills at the pharmacy. Check previous templates.     Requested Prescriptions     Refused Prescriptions Disp Refills   • BUSPIRONE HCL 10 MG Oral Tab [Pharmacy Med Na

## 2021-06-11 NOTE — TELEPHONE ENCOUNTER
From: Godfrey Ponce  To: Ankur Michaud DO  Sent: 6/10/2021 8:03 PM CDT  Subject: Test Results Question    I got this MRI on 6-9-21 I got the results I just wasn't sure if you can [facility] before my appointment with you on Friday

## 2021-06-11 NOTE — PROGRESS NOTES
HPI:   Rashid Owusu is a 37year old female who presents for complains of: Patient presents with:  Checkup: Discuss MRI and EMG, patient wants to discuss next step. Pt has results on phone if not received by [facility].     Sleep Problem: still dealing with this issue out of the mix before we pursue anything else with a rash or neurological things that seem to be happening  I like the idea of simplifying her medication regimen, taking the Cymbalta down to only 30 mg a day once in the morning starting tomorrow

## 2021-06-11 NOTE — PATIENT INSTRUCTIONS
1. Cyclothymic disorder with manic episode ([facility]  For now, I do think your nerves systems are a result of physical manifestation of psychiatric disease  This may be tough to wrap our minds around about how this could cause things, but we need to take this

## 2021-06-14 ENCOUNTER — TELEPHONE (OUTPATIENT)
Dept: INTERNAL MEDICINE CLINIC | Facility: CLINIC | Age: 36
End: 2021-06-14

## 2021-06-14 DIAGNOSIS — F30.10 CYCLOTHYMIC DISORDER WITH MANIC EPISODE (HCC): ICD-10-CM

## 2021-06-14 DIAGNOSIS — F34.0 CYCLOTHYMIC DISORDER WITH MANIC EPISODE (HCC): ICD-10-CM

## 2021-06-14 RX ORDER — RISPERIDONE 0.25 MG/1
0.5 TABLET, FILM COATED ORAL NIGHTLY
Qty: 1 TABLET | Refills: 0 | COMMUNITY
Start: 2021-06-14 | End: 2021-06-18

## 2021-06-14 RX ORDER — ALPRAZOLAM 0.25 MG/1
0.25 TABLET ORAL EVERY 6 HOURS PRN
Qty: 30 TABLET | Refills: 0 | Status: SHIPPED | OUTPATIENT
Start: 2021-06-14 | End: 2021-07-13

## 2021-06-14 NOTE — TELEPHONE ENCOUNTER
Pt. Called as instructed to keven Delgado how she is doing. She stopped taking her water pills, headache meds and anxiety meds. She started the new meds. The tingling and numbness in her legs and feet are getting better.   Her moods are getting a lit

## 2021-06-14 NOTE — TELEPHONE ENCOUNTER
Spoke to patient and relayed MD message and instructions, patient verbalizes understanding and agrees with plan. Med rec updated to reflect taking 2 tablets of risperidone nightly.      To Dr. Daniela Rodriguez-- patient does not have xanax on current med list and states s

## 2021-06-14 NOTE — TELEPHONE ENCOUNTER
Please advise - called patient who states Risperdone is helping a little, but she has headaches constantly , she wakes up with them and goes to bed with them ( not taking anything since she was told to stop Topamax) she has not slept in  3 nights, has lots

## 2021-06-14 NOTE — TELEPHONE ENCOUNTER
Yes, I should have sent this in for her already, Xanax sent to her pharmacy, let her know to follow the instructions on the bottle

## 2021-06-14 NOTE — TELEPHONE ENCOUNTER
Nursing call her, and relay the message below, feel free to read it word by word    Selene Funes, we can work with this let her know that for now, I want her to go up to 2 pills of the risperidone at night, and if needed to induce sleep along with that it is okay

## 2021-06-14 NOTE — TELEPHONE ENCOUNTER
Spoke to patient and relayed MD message, patient verbalized understanding. Discussed with patient this  her drowsy and advised to not drive after taking. She states she will most likely just take this an night as needed for sleep.  She will follow

## 2021-06-18 ENCOUNTER — OFFICE VISIT (OUTPATIENT)
Dept: INTERNAL MEDICINE CLINIC | Facility: CLINIC | Age: 36
End: 2021-06-18
Payer: COMMERCIAL

## 2021-06-18 VITALS
HEART RATE: 88 BPM | HEIGHT: 59 IN | WEIGHT: 154.63 LBS | OXYGEN SATURATION: 98 % | SYSTOLIC BLOOD PRESSURE: 104 MMHG | DIASTOLIC BLOOD PRESSURE: 60 MMHG | TEMPERATURE: 98 F | BODY MASS INDEX: 31.17 KG/M2

## 2021-06-18 DIAGNOSIS — F34.0 CYCLOTHYMIC DISORDER WITH MANIC EPISODE (HCC): ICD-10-CM

## 2021-06-18 DIAGNOSIS — R63.5 WEIGHT GAIN: Primary | ICD-10-CM

## 2021-06-18 DIAGNOSIS — F30.10 CYCLOTHYMIC DISORDER WITH MANIC EPISODE (HCC): ICD-10-CM

## 2021-06-18 DIAGNOSIS — R20.2 PARESTHESIA AND PAIN OF EXTREMITY: ICD-10-CM

## 2021-06-18 DIAGNOSIS — R20.8 BURNING SENSATION OF FEET: ICD-10-CM

## 2021-06-18 DIAGNOSIS — M79.609 PARESTHESIA AND PAIN OF EXTREMITY: ICD-10-CM

## 2021-06-18 PROCEDURE — 3074F SYST BP LT 130 MM HG: CPT | Performed by: INTERNAL MEDICINE

## 2021-06-18 PROCEDURE — 99214 OFFICE O/P EST MOD 30 MIN: CPT | Performed by: INTERNAL MEDICINE

## 2021-06-18 PROCEDURE — 3008F BODY MASS INDEX DOCD: CPT | Performed by: INTERNAL MEDICINE

## 2021-06-18 PROCEDURE — 3078F DIAST BP <80 MM HG: CPT | Performed by: INTERNAL MEDICINE

## 2021-06-18 RX ORDER — RISPERIDONE 0.5 MG/1
0.5 TABLET, FILM COATED ORAL NIGHTLY
Qty: 90 TABLET | Refills: 1 | COMMUNITY
Start: 2021-06-18 | End: 2021-06-29

## 2021-06-18 NOTE — PROGRESS NOTES
HPI:   Ruben Stoddard is a 37year old female who presents for complains of: Patient presents with: Anxiety: Reports her feet hurt if very busy during the day, med adjustments have gone better, mood seems better.     Foot Pain: still having trouble, wi like the idea of getting back to some physicality here, this will definitely improve things  The mornings should get more clear as you progress on with this regimen and your body becomes accustomed to it  Stay on the Cymbalta at 30 mg daily for now  - risp

## 2021-06-18 NOTE — PATIENT INSTRUCTIONS
1. Cyclothymic disorder with manic episode ([facility]  Lets stay with the risperidone at 0.5 mg nightly, along with the Xanax, okay to take Xanax during the day 2 on rough days  But I do like the idea of getting back to some physicality here, this will definite

## 2021-06-24 ENCOUNTER — TELEPHONE (OUTPATIENT)
Dept: INTERNAL MEDICINE CLINIC | Facility: CLINIC | Age: 36
End: 2021-06-24

## 2021-06-24 NOTE — TELEPHONE ENCOUNTER
Please advise - called patient who had pricila 6/18/21 She is taking 2 risperdone and 1 alprazolam at night , nothing else. Her whole face feels numb, fingers hurt when bending and feel heavy,legs feel better , but also heavy .  Sometimes she feels a little ligh

## 2021-06-24 NOTE — TELEPHONE ENCOUNTER
Pt still not feeling right yesterday and today  Hoping to hear back  Face still numb  Pt was seen last week and was doing ok  Printed for Dr Eusebia Hurst nurse

## 2021-06-24 NOTE — TELEPHONE ENCOUNTER
Patient calling to update Dr. Endy Weldon.    Started new medication Risperidone and Xanax. Not feeling right. Started yesterday entire face numb. Fingers and arms are numb. Neck and back still hurts. Applies heat massages, stretching. Not eating salt.   [place] Hint

## 2021-06-25 NOTE — TELEPHONE ENCOUNTER
I did speak with this patient who claims to have on and off generalized facial numbness, no focality to her symptoms, unknown cause, still very anxious, compliant with her current medications, doing better, following with her specialist, I encouraged her t

## 2021-06-29 DIAGNOSIS — F30.10 CYCLOTHYMIC DISORDER WITH MANIC EPISODE (HCC): ICD-10-CM

## 2021-06-29 DIAGNOSIS — F34.0 CYCLOTHYMIC DISORDER WITH MANIC EPISODE (HCC): ICD-10-CM

## 2021-06-29 RX ORDER — RISPERIDONE 1 MG/1
1 TABLET, FILM COATED ORAL NIGHTLY
Qty: 30 TABLET | Refills: 1 | Status: SHIPPED | OUTPATIENT
Start: 2021-06-29 | End: 2021-08-10 | Stop reason: ALTCHOICE

## 2021-06-29 NOTE — TELEPHONE ENCOUNTER
Pt called  Is out of Risperidone & too soon for refill  Patient takes 2 tablets daily   Please send new prescription with updated directions to [place] asap

## 2021-06-29 NOTE — TELEPHONE ENCOUNTER
From my recollection and the note on 2/24, I believe she is taking 1 mg of Risperdal nightly, refilled for that amount.

## 2021-07-12 DIAGNOSIS — F30.10 CYCLOTHYMIC DISORDER WITH MANIC EPISODE (HCC): ICD-10-CM

## 2021-07-12 DIAGNOSIS — F34.0 CYCLOTHYMIC DISORDER WITH MANIC EPISODE (HCC): ICD-10-CM

## 2021-07-13 RX ORDER — ALPRAZOLAM 0.25 MG/1
0.25 TABLET ORAL EVERY 6 HOURS PRN
Qty: 30 TABLET | Refills: 0 | Status: SHIPPED | OUTPATIENT
Start: 2021-07-13 | End: 2021-08-10 | Stop reason: ALTCHOICE

## 2021-07-13 NOTE — TELEPHONE ENCOUNTER
To MD:  The above refill request is for a controlled substance. Please review pended medication order. Print and sign for staff to fax to pharmacy or prescribe electronically. Last office visit:6/18/21  Last time refill sent and quantity/refills:6/14/21 # 30  toDRSarah FORDE

## 2021-07-21 ENCOUNTER — TELEPHONE (OUTPATIENT)
Dept: INTERNAL MEDICINE CLINIC | Facility: CLINIC | Age: 36
End: 2021-07-21

## 2021-07-21 ENCOUNTER — APPOINTMENT (OUTPATIENT)
Dept: CT IMAGING | Facility: HOSPITAL | Age: 36
End: 2021-07-21
Attending: EMERGENCY MEDICINE
Payer: COMMERCIAL

## 2021-07-21 ENCOUNTER — HOSPITAL ENCOUNTER (EMERGENCY)
Facility: HOSPITAL | Age: 36
Discharge: HOME | End: 2021-07-21
Attending: EMERGENCY MEDICINE
Payer: COMMERCIAL

## 2021-07-21 VITALS
BODY MASS INDEX: 30.84 KG/M2 | HEIGHT: 59 IN | RESPIRATION RATE: 16 BRPM | WEIGHT: 153 LBS | DIASTOLIC BLOOD PRESSURE: 56 MMHG | HEART RATE: 77 BPM | OXYGEN SATURATION: 99 % | TEMPERATURE: 98 F | SYSTOLIC BLOOD PRESSURE: 96 MMHG

## 2021-07-21 DIAGNOSIS — R14.0 ABDOMINAL BLOATING: Primary | ICD-10-CM

## 2021-07-21 DIAGNOSIS — F41.9 ANXIETY: ICD-10-CM

## 2021-07-21 DIAGNOSIS — K59.00 CONSTIPATION, UNSPECIFIED CONSTIPATION TYPE: ICD-10-CM

## 2021-07-21 LAB
ANION GAP SERPL CALC-SCNC: 7 MMOL/L (ref 0–18)
B-HCG UR QL: NEGATIVE
BASOPHILS # BLD AUTO: 0.08 X10(3) UL (ref 0–0.2)
BASOPHILS NFR BLD AUTO: 0.8 %
BILIRUB UR QL: NEGATIVE
BUN BLD-MCNC: 9 MG/DL (ref 7–18)
BUN/CREAT SERPL: 14.1 (ref 10–20)
CALCIUM BLD-MCNC: 9.1 MG/DL (ref 8.5–10.1)
CHLORIDE SERPL-SCNC: 108 MMOL/L (ref 98–112)
CLARITY UR: CLEAR
CO2 SERPL-SCNC: 25 MMOL/L (ref 21–32)
COLOR UR: YELLOW
CREAT BLD-MCNC: 0.64 MG/DL
DEPRECATED RDW RBC AUTO: 37.4 FL (ref 35.1–46.3)
EOSINOPHIL # BLD AUTO: 0.54 X10(3) UL (ref 0–0.7)
EOSINOPHIL NFR BLD AUTO: 5.4 %
ERYTHROCYTE [DISTWIDTH] IN BLOOD BY AUTOMATED COUNT: 11 % (ref 11–15)
GLUCOSE BLD-MCNC: 85 MG/DL (ref 70–99)
GLUCOSE UR-MCNC: NEGATIVE MG/DL
HCT VFR BLD AUTO: 35.9 %
HGB BLD-MCNC: 12.1 G/DL
HGB UR QL STRIP.AUTO: NEGATIVE
IMM GRANULOCYTES # BLD AUTO: 0.03 X10(3) UL (ref 0–1)
IMM GRANULOCYTES NFR BLD: 0.3 %
KETONES UR-MCNC: NEGATIVE MG/DL
LEUKOCYTE ESTERASE UR QL STRIP.AUTO: NEGATIVE
LYMPHOCYTES # BLD AUTO: 2.72 X10(3) UL (ref 1–4)
LYMPHOCYTES NFR BLD AUTO: 27.4 %
MCH RBC QN AUTO: 31.3 PG (ref 26–34)
MCHC RBC AUTO-ENTMCNC: 33.7 G/DL (ref 31–37)
MCV RBC AUTO: 93 FL
MONOCYTES # BLD AUTO: 0.66 X10(3) UL (ref 0.1–1)
MONOCYTES NFR BLD AUTO: 6.7 %
NEUTROPHILS # BLD AUTO: 5.89 X10 (3) UL (ref 1.5–7.7)
NEUTROPHILS # BLD AUTO: 5.89 X10(3) UL (ref 1.5–7.7)
NEUTROPHILS NFR BLD AUTO: 59.4 %
NITRITE UR QL STRIP.AUTO: NEGATIVE
OSMOLALITY SERPL CALC.SUM OF ELEC: 288 MOSM/KG (ref 275–295)
PH UR: 6 [PH] (ref 5–8)
PLATELET # BLD AUTO: 298 10(3)UL (ref 150–450)
POTASSIUM SERPL-SCNC: 3.7 MMOL/L (ref 3.5–5.1)
PROT UR-MCNC: NEGATIVE MG/DL
RBC # BLD AUTO: 3.86 X10(6)UL
SODIUM SERPL-SCNC: 140 MMOL/L (ref 136–145)
SP GR UR STRIP: 1.01 (ref 1–1.03)
UROBILINOGEN UR STRIP-ACNC: <2
WBC # BLD AUTO: 9.9 X10(3) UL (ref 4–11)

## 2021-07-21 PROCEDURE — 80048 BASIC METABOLIC PNL TOTAL CA: CPT | Performed by: EMERGENCY MEDICINE

## 2021-07-21 PROCEDURE — 36415 COLL VENOUS BLD VENIPUNCTURE: CPT

## 2021-07-21 PROCEDURE — 99284 EMERGENCY DEPT VISIT MOD MDM: CPT

## 2021-07-21 PROCEDURE — 99213 OFFICE O/P EST LOW 20 MIN: CPT | Performed by: INTERNAL MEDICINE

## 2021-07-21 PROCEDURE — 81025 URINE PREGNANCY TEST: CPT

## 2021-07-21 PROCEDURE — 85025 COMPLETE CBC W/AUTO DIFF WBC: CPT | Performed by: EMERGENCY MEDICINE

## 2021-07-21 PROCEDURE — 74177 CT ABD & PELVIS W/CONTRAST: CPT | Performed by: EMERGENCY MEDICINE

## 2021-07-21 PROCEDURE — 81003 URINALYSIS AUTO W/O SCOPE: CPT | Performed by: EMERGENCY MEDICINE

## 2021-07-21 RX ORDER — MAGNESIUM HYDROXIDE/ALUMINUM HYDROXICE/SIMETHICONE 120; 1200; 1200 MG/30ML; MG/30ML; MG/30ML
10 SUSPENSION ORAL 4 TIMES DAILY PRN
Qty: 355 ML | Refills: 0 | Status: SHIPPED | OUTPATIENT
Start: 2021-07-21 | End: 2021-08-17

## 2021-07-21 RX ORDER — DOCUSATE SODIUM 100 MG/1
100 CAPSULE, LIQUID FILLED ORAL 2 TIMES DAILY
Qty: 14 CAPSULE | Refills: 0 | Status: SHIPPED | OUTPATIENT
Start: 2021-07-21 | End: 2021-07-27 | Stop reason: ALTCHOICE

## 2021-07-21 NOTE — TELEPHONE ENCOUNTER
Spoke to pt regarding Mychart. She reports that for 2 weeks her abdomen has been \"firm, hard as a rock, round as if she is pregnant, and protruding. \" Reports 6/10 sharp abdominal pain above umbilicus and under sternum that is intermittent.  Reports she lo

## 2021-07-21 NOTE — ED PROVIDER NOTES
Patient Seen in: [facility] AND [facility] Emergency Department      History   Patient presents with:  Abdomen/Flank Pain    Stated Complaint: stomach pain, bloating    HPI/Subjective:   HPI    81-year-old female with history of ADHD, chronic back pain, here wit ideas. All other systems reviewed and are negative. Positive for stated complaint: stomach pain, bloating  Other systems are as noted in HPI. Constitutional and vital signs reviewed.       All other systems reviewed and negative except as noted abov 288 275 - 295 mOsm/kg    GFR, Non- 118 >=60    GFR, -American 136 >=60   Urinalysis with Culture Reflex    Collection Time: 07/21/21  1:46 PM    Specimen: Urine   Result Value Ref Range    Urine Color Yellow Yellow    Clarity Urine C and imaging and found no leukocytosis, no anemia, no bacteriuria, electrolytes reassuring  - pt declining pain meds  - pt endorsed to Dr. Lee Grove for continuation of care - f/u CT and likely dc home      Medical Record Review: I personally reviewed flynn

## 2021-07-21 NOTE — ED PROVIDER NOTES
Signout from Dr. Lalo Ferreira pending CT and anticipated discharge home. CT as below. Will treat constipation and patient follow-up with her regular doctor. Hemodynamically stable. Laboratory work-up unremarkable.             CT ABDOMEN+PELVIS(CONTRAST ONLY)(CP distended loops to suggest obstructive process. A normal caliber appendix is identified in the right lower quadrant. ABDOMINAL WALL: Tiny fat containing umbilical hernia. No significant inguinal hernia.  URINARY BLADDER: Patient has a pessary device in pl

## 2021-07-21 NOTE — ED INITIAL ASSESSMENT (HPI)
Patient states her abdomen is protruding and has been growing. Has been taking many stool softeners that are not working [place] they should. \" states bm this am but difficult.

## 2021-07-21 NOTE — TELEPHONE ENCOUNTER
Patient called hoping to see Dr Vennie Homans earlier than her next appt on  Aug 17   for her depression    Takes medication for depression but her dose was decreased   Doing the best she can but it's not enough    Does pt need to be seen again?     Requests call

## 2021-07-22 NOTE — TELEPHONE ENCOUNTER
To Dr. Wendy Murguia - ER F/U - see ER note, below. Pt has not had BM yet, stomach hurts, gets bigger when eating. Pt states she is trying to eat to move things along. States she is drinking lots of water.   States she is taking laxatives per ER orders - Wilmer Perales

## 2021-07-23 NOTE — TELEPHONE ENCOUNTER
If she has stool production I want her to stay with the MiraLAX and Colace twice daily for the long-term to help straighten things out, otherwise agree with the current nursing conversation with the patient.   Will await follow-up

## 2021-07-23 NOTE — TELEPHONE ENCOUNTER
This patient has been seeing Art Herrera, from what I know and should be reaching out to her specialist with her psychiatric symptoms,  Nursing call and try to encourage that.   As far as bowel movements are concerned, I do want her to have some production w

## 2021-07-23 NOTE — TELEPHONE ENCOUNTER
Eric Lr message sent to patient with MD recommendations. Advised to call back to verify that she received the [place].

## 2021-07-23 NOTE — TELEPHONE ENCOUNTER
Spoke to patient and relayed MD message and instructions, patient verbalizes understanding and agrees with plan. She has [facility]'s contact information and agrees to call her.      Patient reports that she had 1 BM last night which was a liquid/diarrhea consiste

## 2021-08-06 RX ORDER — DULOXETIN HYDROCHLORIDE 60 MG/1
60 CAPSULE, DELAYED RELEASE ORAL EVERY EVENING
Qty: 30 CAPSULE | Refills: 2 | COMMUNITY
Start: 2021-08-06 | End: 2021-10-21

## 2021-08-06 NOTE — TELEPHONE ENCOUNTER
Dr. Maranda Myles, please advise. I spoke with patient and she would like your recommendation today. She confirmed that she is safe at home. She denied any thoughts of suicide. She denied any thoughts of hurting someone else. She is home with her daughter.      Nguyen Fuentes

## 2021-08-06 NOTE — TELEPHONE ENCOUNTER
Pt scheduled appt with Alex, not seeing her until 9/15 which is the soonest available appt  Alex is out of the office until next week    Depression symptoms are increasing -  upset/mood swings/states she is not suicidal - and was advised to call Dr Ajay Bianchi as

## 2021-08-06 NOTE — TELEPHONE ENCOUNTER
PressionVirtual Visit/Telephone Note    Rashid Owusu verbally consents to a Virtual/Telephone Check-In service on 04/07/20.   Patient understands and accepts financial responsibility for any deductible, co-insurance and/or co-pays associated with this capsule (60 mg total) by mouth every evening. Dispense: 30 capsule;  Refill: 2    Montse Ying DO  8/6/2021  5:27 PM    Spent 30 minutes obtaining history, evaluating patient, discussing treatment options, diet, exercise, review of available labs

## 2021-08-17 ENCOUNTER — TELEPHONE (OUTPATIENT)
Dept: INTERNAL MEDICINE CLINIC | Facility: CLINIC | Age: 36
End: 2021-08-17

## 2021-08-17 NOTE — PROGRESS NOTES
HPI:   Dawn Cruz is a 37year old female who presents for complains of: Patient presents with: Anxiety: Anxiety/Depression, long discussion.   Still seeing Alex from psychology, doing well with visits there, but still not exactly feeling optimal. management      Spent 30 minutes obtaining history, evaluating patient, discussing treatment options, diet, exercise, review of available labs and radiology reports, and completing documentation.     Francia Cheatham DO  8/17/2021  10:17 AM

## 2021-08-17 NOTE — PATIENT INSTRUCTIONS
1. Bipolar 2 disorder ([facility]  Stable, cont on current course, I do think we need to give it some tincture of time still on a new medication regimen before deciding with snacks  I will message Alex about the question of genetic type testing of tolerating medi

## 2021-08-17 NOTE — TELEPHONE ENCOUNTER
Patient called back and relayed Dr Flower Martinez message. Patient verbalized understanding with no further questions noted.

## 2021-08-17 NOTE — TELEPHONE ENCOUNTER
Alex, I like the new changes of the medications on this patient, but wondering if it would be appropriate to do the genetic type testing that investigates her tolerance of these medications, I have seen that testing done by Dr. Adamaris Baez in the past, khai

## 2021-08-17 NOTE — TELEPHONE ENCOUNTER
Nursing can you reach out to the patient, and I read her this, let her know it is my interpretation of Alex's response of the question of genetic testing for the medication.     \"I normally don't order genetic testing unless there has been multiple failed m

## 2021-08-17 NOTE — TELEPHONE ENCOUNTER
I normally don't order genetic testing unless there has been multiple failed medication trials.  The genetic testing doesn't really give you a report of which medications will definitely be most effective for a patient, but rather how the medication is pred

## 2021-09-20 ENCOUNTER — TELEPHONE (OUTPATIENT)
Dept: INTERNAL MEDICINE CLINIC | Facility: CLINIC | Age: 36
End: 2021-09-20

## 2021-10-13 ENCOUNTER — HOSPITAL ENCOUNTER (OUTPATIENT)
Age: 36
Discharge: HOME | End: 2021-10-13
Payer: COMMERCIAL

## 2021-10-13 VITALS
WEIGHT: 150 LBS | HEART RATE: 95 BPM | TEMPERATURE: 97 F | DIASTOLIC BLOOD PRESSURE: 72 MMHG | RESPIRATION RATE: 18 BRPM | SYSTOLIC BLOOD PRESSURE: 122 MMHG | OXYGEN SATURATION: 100 % | HEIGHT: 59 IN | BODY MASS INDEX: 30.24 KG/M2

## 2021-10-13 DIAGNOSIS — H65.193 ACUTE MEE (MIDDLE EAR EFFUSION), BILATERAL: ICD-10-CM

## 2021-10-13 DIAGNOSIS — B34.9 VIRAL SYNDROME: Primary | ICD-10-CM

## 2021-10-13 PROCEDURE — 99214 OFFICE O/P EST MOD 30 MIN: CPT

## 2021-10-13 PROCEDURE — 99213 OFFICE O/P EST LOW 20 MIN: CPT

## 2021-10-13 PROCEDURE — 87880 STREP A ASSAY W/OPTIC: CPT

## 2021-10-13 RX ORDER — DEXAMETHASONE 4 MG/1
12 TABLET ORAL ONCE
Status: COMPLETED | OUTPATIENT
Start: 2021-10-13 | End: 2021-10-13

## 2021-10-13 NOTE — ED INITIAL ASSESSMENT (HPI)
Patient here for evaluation of a sore throat, swollen glands to the neck, body aches and pains, bilateral ear pain that all started yesterday. States her daughter had Croup last week.  Denies any fevers, chills, N/V/D, loss of taste or smell, coughing, SOB/

## 2021-10-13 NOTE — ED PROVIDER NOTES
Patient Seen in: [facility]      History   Patient presents with:  Sore Throat  Body ache and/or chills    Stated Complaint: weakness    Subjective:   HPI  68-year-old female presents immediate care complaining of nasal congestion, ear pr Physical Exam  Vitals and nursing note reviewed. Constitutional:       General: She is not in acute distress. Appearance: She is well-developed. She is not ill-appearing or toxic-appearing.    HENT:      Right Ear: Ear canal normal. A middle e needed          Medications Prescribed:  Discharge Medication List as of 10/13/2021  8:50 AM

## 2021-10-21 PROBLEM — F39 MOOD DISORDER (HCC): Status: ACTIVE | Noted: 2021-10-21

## 2021-10-21 PROBLEM — F34.0 CYCLOTHYMIC DISORDER: Status: ACTIVE | Noted: 2021-10-21

## 2021-10-21 PROBLEM — F39 MOOD DISORDER: Status: ACTIVE | Noted: 2021-10-21

## 2021-11-14 RX ORDER — ARIPIPRAZOLE 2 MG/1
4 TABLET ORAL DAILY
Qty: 30 TABLET | Refills: 0 | OUTPATIENT
Start: 2021-11-14

## 2021-11-14 NOTE — TELEPHONE ENCOUNTER
Refill request has failed the Ambulatory Medication Refill Standing Order and is routed to the primary physician to review the following:    Requested Prescriptions     Refused Prescriptions Disp Refills   • ARIPiprazole 2 MG Oral Tab 30 tablet 0     Sig:

## 2021-11-15 ENCOUNTER — NURSE ONLY (OUTPATIENT)
Dept: INTERNAL MEDICINE CLINIC | Facility: CLINIC | Age: 36
End: 2021-11-15
Payer: COMMERCIAL

## 2021-11-15 ENCOUNTER — TELEPHONE (OUTPATIENT)
Dept: INTERNAL MEDICINE CLINIC | Facility: CLINIC | Age: 36
End: 2021-11-15

## 2021-11-15 DIAGNOSIS — Z00.00 PHYSICAL EXAM: Primary | ICD-10-CM

## 2021-11-15 DIAGNOSIS — Z11.1 SCREENING FOR TUBERCULOSIS: ICD-10-CM

## 2021-11-15 DIAGNOSIS — Z23 NEED FOR INFLUENZA VACCINATION: Primary | ICD-10-CM

## 2021-11-15 DIAGNOSIS — Z23 NEED FOR INFLUENZA VACCINATION: ICD-10-CM

## 2021-11-15 DIAGNOSIS — Z11.1 SCREENING FOR TUBERCULOSIS: Primary | ICD-10-CM

## 2021-11-15 PROCEDURE — 90471 IMMUNIZATION ADMIN: CPT | Performed by: INTERNAL MEDICINE

## 2021-11-15 PROCEDURE — 86580 TB INTRADERMAL TEST: CPT | Performed by: INTERNAL MEDICINE

## 2021-11-15 PROCEDURE — 90686 IIV4 VACC NO PRSV 0.5 ML IM: CPT | Performed by: INTERNAL MEDICINE

## 2021-11-15 NOTE — TELEPHONE ENCOUNTER
Patient needs TB test done, hopefully today as she will be in [place] 8:30 am - 3pm, for a new job she is starting next week  She will also need a copy of her physical that she had done in January 2021     Please call pt to advise on TB test & if she can

## 2021-11-15 NOTE — TELEPHONE ENCOUNTER
Order for PPD and flu vaccine entered - called patient and transferred to [facility] to schedule nurse visit for today

## 2021-11-15 NOTE — TELEPHONE ENCOUNTER
Could you please advise for DR. FORDE patient since he will start in afternoon - thanks - to DR. YOUNG

## 2021-11-15 NOTE — TELEPHONE ENCOUNTER
Message noted. I will forward this on to Dr. Zurdo Kee as he can decide on TB Gold interferon test versus TB skin test.    Joselyn Starkey.  Best Grover

## 2021-11-15 NOTE — TELEPHONE ENCOUNTER
To Dr. José Zepeda, please advise. Patient was in the office today for a TB skin test placement and flu shot. She will be back on Wednesday to have the TB test read. Patient says that she will need the [facility] form completed.  She

## 2021-11-15 NOTE — TELEPHONE ENCOUNTER
Nursing, I do not understand why were sending this to the on-call physician, just put in the TB testing/PPD, set up in the office as well as the flu shot

## 2021-11-15 NOTE — PROGRESS NOTES
Subjective:   Patient ID: Aleksandr Ware is a 31year old female. HPI    History/Other:   Review of Systems  Current Outpatient Medications   Medication Sig Dispense Refill   • ARIPiprazole 2 MG Oral Tab Take 2 tablets (4 mg total) by mouth daily.  6

## 2021-11-17 NOTE — TELEPHONE ENCOUNTER
Patient stopped in today to have her TB read and to  the form left behind on Monday. TB was read and form was given to patient. On form Dr Corinne Salina checked that the patient did receive/is up to date on her MMR shot.  But when printing the patien

## 2021-11-17 NOTE — TELEPHONE ENCOUNTER
Spoke to pt and advised MMR not noted in [address] [place] [address] records, full chart search or [facility] query. Advised typically a childhood vaccine and she should try to contact pediatrician if able or ask parent if she has copy of records.     Pt reports she already has call out

## 2021-11-22 ENCOUNTER — LAB ENCOUNTER (OUTPATIENT)
Dept: LAB | Facility: HOSPITAL | Age: 36
End: 2021-11-22
Attending: INTERNAL MEDICINE
Payer: COMMERCIAL

## 2021-11-22 DIAGNOSIS — Z00.00 PHYSICAL EXAM: ICD-10-CM

## 2021-11-22 PROCEDURE — 36415 COLL VENOUS BLD VENIPUNCTURE: CPT

## 2021-11-22 PROCEDURE — 86765 RUBEOLA ANTIBODY: CPT

## 2021-11-22 PROCEDURE — 86735 MUMPS ANTIBODY: CPT

## 2021-11-22 PROCEDURE — 86762 RUBELLA ANTIBODY: CPT

## 2021-11-22 NOTE — TELEPHONE ENCOUNTER
Pt. Called stating she is asking Dr. Jonas Reyes or order her he lab test she needs to check her Immunity for MMR. She needs this done ASAP for a new job that she is starting next week. Pt. Can be reached at [phone].

## 2021-11-22 NOTE — TELEPHONE ENCOUNTER
Called patient and relayed DR. FORDE message - that's the only test she needs - she will get it from [facility] and [facility] for [employer]

## 2021-11-30 ENCOUNTER — TELEPHONE (OUTPATIENT)
Dept: INTERNAL MEDICINE CLINIC | Facility: CLINIC | Age: 36
End: 2021-11-30

## 2021-11-30 DIAGNOSIS — R39.9 UTI SYMPTOMS: Primary | ICD-10-CM

## 2021-11-30 RX ORDER — NITROFURANTOIN 25; 75 MG/1; MG/1
100 CAPSULE ORAL 2 TIMES DAILY
Qty: 14 CAPSULE | Refills: 0 | Status: SHIPPED | OUTPATIENT
Start: 2021-11-30 | End: 2022-01-03

## 2021-11-30 RX ORDER — PHENAZOPYRIDINE HYDROCHLORIDE 200 MG/1
200 TABLET, FILM COATED ORAL 3 TIMES DAILY PRN
Qty: 6 TABLET | Refills: 0 | Status: SHIPPED | OUTPATIENT
Start: 2021-11-30 | End: 2021-12-02

## 2021-11-30 NOTE — TELEPHONE ENCOUNTER
Spoke with patient and relayed Dr. Tete Goyal message and instructions. Patient verbalized understanding and was able to repeat back instructions--she verbalized that she should provide urine sample prior to starting medications.  Advised patient on clean ca

## 2021-11-30 NOTE — TELEPHONE ENCOUNTER
You can let her know I sent in an empiric antibiotic and a bladder spasm to reducer called Pyridium, and I want her to do a urine culture before starting these other 2 medications, the Pyridium is only for 6 doses, but should help significantly, we will re

## 2021-11-30 NOTE — TELEPHONE ENCOUNTER
To Dr. Heather Scott to please advise------  UTI symptoms:  [x]Frequency  [x]Urgency  []Pain/burning  []Blood in urine  [x]Pelvic pressure and discomfort  []Flank pain  []Fevers/chills  [x]Malodorous urine  []Confusion    NOTES:  Symptom onset: 1 week ago  Betsy matias

## 2021-11-30 NOTE — TELEPHONE ENCOUNTER
Pt. Called stating she thinks she has a bladder infection. She has frequency. As soon as she feels the urge her urine just empty's out into her pants. She has no control. She also has times where she has frequency with little output.   Also, she feels li

## 2021-12-01 ENCOUNTER — LAB ENCOUNTER (OUTPATIENT)
Dept: LAB | Facility: HOSPITAL | Age: 36
End: 2021-12-01
Attending: INTERNAL MEDICINE
Payer: COMMERCIAL

## 2021-12-01 DIAGNOSIS — R39.9 UTI SYMPTOMS: ICD-10-CM

## 2021-12-01 PROCEDURE — 87186 SC STD MICRODIL/AGAR DIL: CPT

## 2021-12-01 PROCEDURE — 87088 URINE BACTERIA CULTURE: CPT

## 2021-12-01 PROCEDURE — 87086 URINE CULTURE/COLONY COUNT: CPT

## 2021-12-01 PROCEDURE — 81003 URINALYSIS AUTO W/O SCOPE: CPT

## 2022-01-02 NOTE — TELEPHONE ENCOUNTER
LMTCB RN alerted to pt climbing out of bed and removing IV from arm. Pt is confused at baseline. Placed back in restraints at this time, contacted Dr. Max Talbert.  Awaiting restraint orders

## 2022-01-03 ENCOUNTER — HOSPITAL ENCOUNTER (OUTPATIENT)
Age: 37
Discharge: HOME | End: 2022-01-03
Payer: COMMERCIAL

## 2022-01-03 VITALS
OXYGEN SATURATION: 99 % | TEMPERATURE: 100 F | HEART RATE: 99 BPM | RESPIRATION RATE: 18 BRPM | SYSTOLIC BLOOD PRESSURE: 114 MMHG | DIASTOLIC BLOOD PRESSURE: 65 MMHG | BODY MASS INDEX: 30.24 KG/M2 | WEIGHT: 150 LBS | HEIGHT: 59 IN

## 2022-01-03 DIAGNOSIS — U07.1 COVID-19 VIRUS INFECTION: Primary | ICD-10-CM

## 2022-01-03 LAB
POCT INFLUENZA A: NEGATIVE
POCT INFLUENZA B: NEGATIVE
SARS-COV-2 RNA RESP QL NAA+PROBE: DETECTED

## 2022-01-03 PROCEDURE — 99212 OFFICE O/P EST SF 10 MIN: CPT

## 2022-01-03 PROCEDURE — 99213 OFFICE O/P EST LOW 20 MIN: CPT

## 2022-01-03 PROCEDURE — 87502 INFLUENZA DNA AMP PROBE: CPT | Performed by: NURSE PRACTITIONER

## 2022-01-04 ENCOUNTER — TELEPHONE (OUTPATIENT)
Dept: INTERNAL MEDICINE CLINIC | Facility: CLINIC | Age: 37
End: 2022-01-04

## 2022-01-04 ENCOUNTER — HOSPITAL ENCOUNTER (EMERGENCY)
Facility: HOSPITAL | Age: 37
Discharge: HOME | End: 2022-01-04
Attending: EMERGENCY MEDICINE
Payer: COMMERCIAL

## 2022-01-04 VITALS
DIASTOLIC BLOOD PRESSURE: 64 MMHG | HEIGHT: 59 IN | SYSTOLIC BLOOD PRESSURE: 101 MMHG | WEIGHT: 150 LBS | HEART RATE: 87 BPM | OXYGEN SATURATION: 99 % | RESPIRATION RATE: 16 BRPM | TEMPERATURE: 99 F | BODY MASS INDEX: 30.24 KG/M2

## 2022-01-04 DIAGNOSIS — U07.1 COVID-19: Primary | ICD-10-CM

## 2022-01-04 PROCEDURE — 94640 AIRWAY INHALATION TREATMENT: CPT

## 2022-01-04 PROCEDURE — 99283 EMERGENCY DEPT VISIT LOW MDM: CPT

## 2022-01-04 RX ORDER — ACETAMINOPHEN 500 MG
TABLET ORAL
Status: COMPLETED
Start: 2022-01-04 | End: 2022-01-04

## 2022-01-04 RX ORDER — ALBUTEROL SULFATE 90 UG/1
8 AEROSOL, METERED RESPIRATORY (INHALATION) ONCE
Status: COMPLETED | OUTPATIENT
Start: 2022-01-04 | End: 2022-01-04

## 2022-01-04 RX ORDER — ACETAMINOPHEN 500 MG
1000 TABLET ORAL ONCE
Status: COMPLETED | OUTPATIENT
Start: 2022-01-04 | End: 2022-01-04

## 2022-01-04 RX ORDER — ALBUTEROL SULFATE 90 UG/1
2 AEROSOL, METERED RESPIRATORY (INHALATION) EVERY 6 HOURS PRN
Qty: 1 EACH | Refills: 0 | Status: SHIPPED | OUTPATIENT
Start: 2022-01-04

## 2022-01-04 NOTE — ED INITIAL ASSESSMENT (HPI)
Charles Felt, APRN at the bedside assessing patient. 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 States today she started with chills, body aches, and headache.  Denies any  symptoms, fe

## 2022-01-04 NOTE — TELEPHONE ENCOUNTER
Pt tested positive for Covid yesterday at [facility]    Pt has chills, headache, very fatigued, nasal congestion & sore throat  Asks if Dr Magdi Nelson can order the antibody infusion for her     [phone]    Message 1 of 2 w/ same last name    is a

## 2022-01-04 NOTE — ED PROVIDER NOTES
Patient Seen in: [facility]      History   Patient presents with:   Body ache and/or chills    Stated Complaint: Covid Test, URI SOB Headache Chills    Subjective:   HPI  61-year-old female who is not fully covered vaccinated who works at [employer] 12/27/2021 (Within Days)   SpO2 99%   BMI 30.30 kg/m²         Physical Exam  Vitals and nursing note reviewed. Constitutional:       General: She is not in acute distress. Appearance: Normal appearance. She is well-developed.  She is ill-appearin

## 2022-01-04 NOTE — ED PROVIDER NOTES
Patient Seen in: [facility] Emergency Department      History   Patient presents with:  Covid  Difficulty Breathing    Stated Complaint: COVID+ since yesterday, SOB     Subjective:   HPI    Patient is a 37-year-old female comes in emergency room for Device 01/04/22 0110 None (Room air)       Current:/64   Pulse 87   Temp 99.2 °F (37.3 °C) (Temporal)   Resp 16   Ht 149.9 cm (4' 11\")   Wt 68 kg   [facility] 12/27/2021 (Within Days)   SpO2 99%   BMI 30.30 kg/m²         Physical Exam    GENERAL: No acute Patient was instructed to follow-up with her primary care provider for further evaluation and treatment, but to return immediately to the ER for worsening, concerning, new, changing or persisting symptoms.   I discussed the case with the patient and they ha

## 2022-01-04 NOTE — TELEPHONE ENCOUNTER
Respiratory infection triage:    Fever:  [x]  No fever  []  Fever>100.4    Cough:  [] Tight cough  [] Cough with exertion  [] Dry cough  [] Sputum production, Color:     Breathing:  [x] Mild shortness of breath interfering with activity  [] Wheezing  [] Pa

## 2022-01-04 NOTE — TELEPHONE ENCOUNTER
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

## 2022-01-04 NOTE — ED INITIAL ASSESSMENT (HPI)
+ covid test yesterday. States symptom onset was yesterday. + SOB, nasal congestion, body aches, loss of appetite. Headache also. Sent to the ED per her PMD for [facility]AC[facility]. Speaking in full sentences, no acute distress noted.

## 2022-01-06 NOTE — TELEPHONE ENCOUNTER
Per record review, patient was seen and discharged from [facility], 1/4/22. She should f/u as needed as instructed.

## 2022-03-15 ENCOUNTER — OFFICE VISIT (OUTPATIENT)
Dept: FAMILY MEDICINE CLINIC | Facility: CLINIC | Age: 37
End: 2022-03-15
Payer: COMMERCIAL

## 2022-03-15 ENCOUNTER — TELEPHONE (OUTPATIENT)
Dept: INTERNAL MEDICINE CLINIC | Facility: CLINIC | Age: 37
End: 2022-03-15

## 2022-03-15 VITALS
BODY MASS INDEX: 28.83 KG/M2 | TEMPERATURE: 98 F | RESPIRATION RATE: 14 BRPM | OXYGEN SATURATION: 99 % | SYSTOLIC BLOOD PRESSURE: 114 MMHG | HEART RATE: 70 BPM | DIASTOLIC BLOOD PRESSURE: 72 MMHG | WEIGHT: 143 LBS | HEIGHT: 59 IN

## 2022-03-15 DIAGNOSIS — J02.9 SORE THROAT: Primary | ICD-10-CM

## 2022-03-15 LAB
CONTROL LINE PRESENT WITH A CLEAR BACKGROUND (YES/NO): YES YES/NO
KIT LOT #: NORMAL NUMERIC

## 2022-03-15 PROCEDURE — 3008F BODY MASS INDEX DOCD: CPT | Performed by: NURSE PRACTITIONER

## 2022-03-15 PROCEDURE — 99213 OFFICE O/P EST LOW 20 MIN: CPT | Performed by: NURSE PRACTITIONER

## 2022-03-15 PROCEDURE — 3078F DIAST BP <80 MM HG: CPT | Performed by: NURSE PRACTITIONER

## 2022-03-15 PROCEDURE — 3074F SYST BP LT 130 MM HG: CPT | Performed by: NURSE PRACTITIONER

## 2022-03-15 PROCEDURE — 87081 CULTURE SCREEN ONLY: CPT | Performed by: NURSE PRACTITIONER

## 2022-03-15 PROCEDURE — 99443 PHONE E/M BY PHYS 21-30 MIN: CPT | Performed by: INTERNAL MEDICINE

## 2022-03-15 PROCEDURE — 87880 STREP A ASSAY W/OPTIC: CPT | Performed by: NURSE PRACTITIONER

## 2022-03-15 RX ORDER — OSELTAMIVIR PHOSPHATE 75 MG/1
75 CAPSULE ORAL 2 TIMES DAILY
Qty: 10 CAPSULE | Refills: 0 | Status: SHIPPED | OUTPATIENT
Start: 2022-03-15 | End: 2022-03-20

## 2022-03-15 NOTE — TELEPHONE ENCOUNTER
Virtual Visit/Telephone Note    Luna Wade verbally consents to a Virtual/Telephone Check-In service on 03/15/22  Patient understands and accepts financial responsibility for any deductible, co-insurance and/or co-pays associated with this service. Duration/time spent of the service: 20 Minutes of direct patient contact. 10 Minutes of chart review, documentation, medical decision making. HPI:   Luna Wade is a 31year old female who presents for complains of: Patient presents with:  Acute: Sore Throat, Ears Clogged, Stuffy  Sore throat/influenza exposure: Patient claims that her daughter tested positive for influenza A 2 days ago, has been sick with a fever has been staying home, normally is young enough to be in , patient claims for the past 2 to 3 days she has been feeling sore throat, clogged ears, stuffy nose, she did see an immediate care today, who did not feel strongly that she had anything else but a viral infection, she was tested for strep throat, culture pending, instructed to take over-the-counter medications. Physical exam:   Patient sounds to be in her usual state of health, telephone visit only, no obvious shortness of breath, no obvious pain, no cough    ASSESSMENT AND PLAN:   Luna Wade is a 31year old female who presents with the followin. Influenza A  I like the idea of treating for flu whether it is once or twice a day with the Tamiflu based on your acute illness. Tylenol is certainly fine if you get fevers, over-the-counter medication to treat the upper respiratory infection is always recommended. If something progresses on and you need antibiotics, you will have to call us next week early. Since you have gotten the flu shot, this should be effective for the given your but unfortunately we do not have enough data to prove this just yet. Nasal spray can be used as well for nasal irritation if you think it is nasal/allergy related.   Certainly consider that. - oseltamivir 75 MG Oral Cap; Take 1 capsule (75 mg total) by mouth 2 (two) times daily for 5 days. Dispense: 10 capsule; Refill: 0      Faustino Viera DO  3/15/2022  4:58 PM    Spent 30 minutes obtaining history, evaluating patient, discussing treatment options, diet, exercise, review of available labs and radiology reports, and completing documentation.

## 2022-03-15 NOTE — TELEPHONE ENCOUNTER
To Dr. Clay Bautista----    Spoke to pt who reports her symptoms  Developed yesterday with sore throat, pressure/clogged bilateral ears, and nasal congestion. States her dtr tested + for influenza A on 3/13. Denies cough, fever, breathing issues, HA, n/v/d, covid exposure. Has not tested yet. Hoping for medication to be prescribed to [facility] in [facility].      Please advise thanks! (no more appts for today)

## 2022-03-15 NOTE — TELEPHONE ENCOUNTER
Patient is calling yesterday she developed a sore throat, ears clogged & stuffy. No fever, no known covid exposure. Her daughter tested positive for Influenza A on 3/13. Patient is requested something be called into [facility].     Please call [phone]

## 2022-03-17 ENCOUNTER — TELEPHONE (OUTPATIENT)
Dept: INTERNAL MEDICINE CLINIC | Facility: CLINIC | Age: 37
End: 2022-03-17

## 2022-03-17 NOTE — TELEPHONE ENCOUNTER
Telephone call to patient and message left. It appears patient has been given Tamiflu by Dr. Warner Connelly which presumably patient has started. Patient will call back if she has any questions or problems.

## 2022-03-17 NOTE — TELEPHONE ENCOUNTER
See TT dated 3/15/22     Please call patient  She was prescribed Tamiflu by Dr Juan Brown  Pt daughter has now been diagnosed with influenza A, pneumonia and bronchitis  Pt has stuffy nose, feels cloudy, no other symptoms as of now  Should medication be prescribed?   Tasked to nursing

## 2022-03-18 ENCOUNTER — HOSPITAL ENCOUNTER (OUTPATIENT)
Age: 37
Discharge: HOME | End: 2022-03-18
Attending: EMERGENCY MEDICINE
Payer: COMMERCIAL

## 2022-03-18 ENCOUNTER — TELEPHONE (OUTPATIENT)
Dept: INTERNAL MEDICINE CLINIC | Facility: CLINIC | Age: 37
End: 2022-03-18

## 2022-03-18 VITALS
RESPIRATION RATE: 18 BRPM | HEART RATE: 72 BPM | DIASTOLIC BLOOD PRESSURE: 64 MMHG | WEIGHT: 140 LBS | OXYGEN SATURATION: 98 % | SYSTOLIC BLOOD PRESSURE: 117 MMHG | BODY MASS INDEX: 28.22 KG/M2 | TEMPERATURE: 99 F | HEIGHT: 59 IN

## 2022-03-18 DIAGNOSIS — R09.81 SINUS CONGESTION: ICD-10-CM

## 2022-03-18 DIAGNOSIS — J10.1 INFLUENZA A: Primary | ICD-10-CM

## 2022-03-18 PROCEDURE — 99212 OFFICE O/P EST SF 10 MIN: CPT

## 2022-03-18 NOTE — TELEPHONE ENCOUNTER
Per Dr Darrian Prieto message regarding patient's MyChart symptoms not improving. Continue with Tamiflu. Would recommend trial of Mucinex D twice daily to help with congestion and cough. But if symptoms are worsening, I would recommend evaluation at urgent care--we will then also need Covid testing.    ----- Message -----  From: Georgina Arias CMA  Sent: 3/18/2022   9:32 AM CDT  To: Uma Jha MD  Subject: FW: Our last conversation please read                ----- Message -----  From: Aleksey Wolf  Sent: 3/18/2022   8:04 AM CDT  To: Aravind Meehan Clinical Staff  Subject: Our last conversation please read                ----- Message from Tita Ag sent at 3/18/2022  8:04 AM CDT -----       ----- Message sent from Valarie Dance, DO to Susy Tariq at 3/15/2022  5:09 PM -----   1. Influenza A  I like the idea of treating for flu whether it is once or twice a day with the Tamiflu based on your acute illness. Tylenol is certainly fine if you get fevers, over-the-counter medication to treat the upper respiratory infection is always recommended. If something progresses on and you need antibiotics, you will have to call us next week early. Since you have gotten the flu shot, this should be effective for the given your but unfortunately we do not have enough data to prove this just yet. Nasal spray can be used as well for nasal irritation if you think it is nasal/allergy related. Certainly consider that. - oseltamivir 75 MG Oral Cap; Take 1 capsule (75 mg total) by mouth 2 (two) times daily for 5 days. Dispense: 10 capsule;  Refill: [address] [facility] [facility] [address], DO  3/15/2022  4:58 PM

## 2022-03-18 NOTE — ED INITIAL ASSESSMENT (HPI)
C/o onset Tuesday nasal congestion/stuffy nose, runny nose, ears popping, sore throat, cough and headache. Exposed to daughter with Influenza A. Taking Tamiflu since Tuesday but not any better Negative strep on 3/15/2022. Hx of Covid positive Jan.2022.

## 2022-03-24 RX ORDER — BENZONATATE 200 MG/1
200 CAPSULE ORAL 3 TIMES DAILY PRN
Qty: 60 CAPSULE | Refills: 0 | Status: SHIPPED | OUTPATIENT
Start: 2022-03-24

## 2022-03-24 NOTE — TELEPHONE ENCOUNTER
Please call patient  She is feeling some better but has cough that won't go away  Can medication be prescribed?   [facility] uses [facility]  Tasked to nursing

## 2022-03-25 NOTE — TELEPHONE ENCOUNTER
Lets try Tessalon Perles 200 mg 3 times daily as needed, I will send her a [facility] message

## 2022-04-20 DIAGNOSIS — F41.9 ANXIETY: ICD-10-CM

## 2022-04-21 RX ORDER — DULOXETIN HYDROCHLORIDE 30 MG/1
CAPSULE, DELAYED RELEASE ORAL
Qty: 30 CAPSULE | Refills: 0 | OUTPATIENT
Start: 2022-04-21

## 2022-06-02 ENCOUNTER — TELEPHONE (OUTPATIENT)
Dept: INTERNAL MEDICINE CLINIC | Facility: CLINIC | Age: 37
End: 2022-06-02

## 2022-06-02 NOTE — TELEPHONE ENCOUNTER
Pt. Called stating she developed a sore throat yesterday and still has it today. She said she has no other symptoms. Pt. Asking if Dr. Geneva Luna will prescribe some meds for her? Pt. Can be reached at [phone].

## 2022-06-02 NOTE — TELEPHONE ENCOUNTER
COVID triage:    Start of symptoms: yesterday morning    Fever:  []  No fever  []  Temperature:   []  Chills  []  Night sweats    Cough:  [] Productive cough  [] Cough with exertion  [] Dry cough    Breathing:  [] Wheezing  [] Pain with deep breathing  [] SOB with exertion  [] SOB at rest  [] Heavy breathing  [] Chest discomfort with deep breathing or coughing    GI Symptoms:  [] Diarrhea  [] Nausea  [] Vomiting  [] Abdominal pain  [] Lack of appetite    Other symptoms:  [x] Sore throat  [x] Difficulty swallowing, uncomfortable   [] Nasal drainage  [x] Nasal congestion (hx of allergies)   [x] PND  [] Sinus pressure  [] Chest congestion  [] Head congestion  [] Facial pain   [] Ear pain  [] Body aches  [] Loss of sense of smell   [] Loss of sense of taste  [x]Conjunctivitis, itchy, increased drainage  [] Headache  [] Fatigue  [] Weakness    [x]OTC Medications:  Flonase  Claritin         [] Any recent travel? [] Any sick contacts? Unknown   [] Are you a healthcare worker? Vaccinated: Yes  []   No [x]  Booster:  Yes  []  No [x]    To Dr. FORDE:  Pt inquiring about further recommendations for symptom management. 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 Please advise.

## 2022-06-03 NOTE — TELEPHONE ENCOUNTER
Dr Fabian Ewing tried to call patient back yesterday but was not able to reach her. Spoke to patient  She woke up with sore throat on Wednesday, throat felt itchy. Her throat no longer hurts. Now she has a runny nose, a slight cough, \"I have a lot of mucus and spit\"  She says now her ears are popping when she swallows. He ears feel clogged. They do not hurt. She denies fever or chills. She takes an instant COVID test every Monday for her work. Last one was negative. At this point what is bothering her the most is that her ears feel clogged and she has \"excess mucus\"  She says she \"doesn't feel sick, I don't feel like I cant function\"  She wonders if this is allergy related?     She has taken ibuprofen, Claritin, and flonase    She is wondering what she should take for these symptoms    To Dr Díaz Ask can you please review for Dr FORDE

## 2022-06-03 NOTE — TELEPHONE ENCOUNTER
Called patient back:    Symptoms started Weds and throat was dry, sore, drank water that did help minimally. She went on with her day and woke up the next day and more sore. A little bit of a cough, intermittently. Not constant. Tried ibuprofen, claritin, flonase and was feeling OK yesterday. 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 The ears started popping and felt clogged. No pain, just popping with swallowing. She has a lot of spit in her mouth more than usual. Has not yet taken. She does not feel week, or sick. I recommended that she continue to use Flonase and Claritin consistently even after symptoms improved. She should also add a decongestant component:    Phenylephrine 10 mg every 4 hours as needed. Should be over-the-counter and should not use more than 3 days consecutively  Alternatively can try Claritin-D, or other allergy component with a decongestant available. Should not use more than 3 days consecutively.

## 2022-06-03 NOTE — TELEPHONE ENCOUNTER
2 phone calls placed to the patient's cell phone 15 minutes apart, no answer, no ability to leave voicemail

## 2022-06-11 ENCOUNTER — TELEPHONE (OUTPATIENT)
Dept: INTERNAL MEDICINE CLINIC | Facility: CLINIC | Age: 37
End: 2022-06-11

## 2022-06-11 NOTE — TELEPHONE ENCOUNTER
Patient called with complaints of sinus and nasal congestion along with postnasal drip. Has occasional cough without sputum production  Denies fevers or chills. Denies shortness of breath or wheezing. Suggested management for seasonal/environmental allergies with Zyrtec-D 1 tablet daily along with consistent use of Flonase nasal spray 2 sprays each nostril daily. Patient to contact office if no better in 5 to 7 days or sooner if worse. Patient understands and agrees with plan.

## 2022-06-12 ENCOUNTER — OFFICE VISIT (OUTPATIENT)
Dept: FAMILY MEDICINE CLINIC | Facility: CLINIC | Age: 37
End: 2022-06-12
Payer: COMMERCIAL

## 2022-06-12 VITALS
DIASTOLIC BLOOD PRESSURE: 77 MMHG | TEMPERATURE: 98 F | BODY MASS INDEX: 27.01 KG/M2 | HEART RATE: 95 BPM | RESPIRATION RATE: 18 BRPM | OXYGEN SATURATION: 97 % | HEIGHT: 59 IN | WEIGHT: 134 LBS | SYSTOLIC BLOOD PRESSURE: 106 MMHG

## 2022-06-12 DIAGNOSIS — H66.001 NON-RECURRENT ACUTE SUPPURATIVE OTITIS MEDIA OF RIGHT EAR WITHOUT SPONTANEOUS RUPTURE OF TYMPANIC MEMBRANE: Primary | ICD-10-CM

## 2022-06-12 PROCEDURE — 3078F DIAST BP <80 MM HG: CPT | Performed by: NURSE PRACTITIONER

## 2022-06-12 PROCEDURE — 99213 OFFICE O/P EST LOW 20 MIN: CPT | Performed by: NURSE PRACTITIONER

## 2022-06-12 PROCEDURE — 3074F SYST BP LT 130 MM HG: CPT | Performed by: NURSE PRACTITIONER

## 2022-06-12 PROCEDURE — 3008F BODY MASS INDEX DOCD: CPT | Performed by: NURSE PRACTITIONER

## 2022-06-12 RX ORDER — AMOXICILLIN 875 MG/1
875 TABLET, COATED ORAL 2 TIMES DAILY
Qty: 20 TABLET | Refills: 0 | Status: SHIPPED | OUTPATIENT
Start: 2022-06-12 | End: 2022-06-22

## 2022-06-14 ENCOUNTER — HOSPITAL ENCOUNTER (OUTPATIENT)
Age: 37
Discharge: HOME | End: 2022-06-14
Attending: EMERGENCY MEDICINE
Payer: COMMERCIAL

## 2022-06-14 VITALS
TEMPERATURE: 98 F | DIASTOLIC BLOOD PRESSURE: 81 MMHG | SYSTOLIC BLOOD PRESSURE: 121 MMHG | RESPIRATION RATE: 18 BRPM | HEART RATE: 85 BPM | OXYGEN SATURATION: 99 %

## 2022-06-14 DIAGNOSIS — J06.9 VIRAL URI: Primary | ICD-10-CM

## 2022-06-14 DIAGNOSIS — H92.03 ACUTE EAR PAIN, BILATERAL: ICD-10-CM

## 2022-06-14 LAB
S PYO AG THROAT QL: NEGATIVE
SARS-COV-2 RNA RESP QL NAA+PROBE: NOT DETECTED

## 2022-06-14 PROCEDURE — 99213 OFFICE O/P EST LOW 20 MIN: CPT

## 2022-06-14 PROCEDURE — 87880 STREP A ASSAY W/OPTIC: CPT

## 2022-06-14 NOTE — ED INITIAL ASSESSMENT (HPI)
PATIENT ARRIVED AMBULATORY TO ROOM C/O INTERMITTENT SYMPTOMS X2 WEEKS. +INTERMITTENT SORE THROAT +NON PRODUCTIVE COUGH. NO NASAL CONGESTION. NO FEVERS. NO N/V/D. PATIENT WAS SEEN IN THE [facility] 2 DAYS AGO AND WAS PRESCRIBED AMOX FOR BILATERAL OTITIS.

## 2022-07-02 ENCOUNTER — HOSPITAL ENCOUNTER (OUTPATIENT)
Age: 37
Discharge: HOME | End: 2022-07-02
Payer: COMMERCIAL

## 2022-07-02 ENCOUNTER — TELEPHONE (OUTPATIENT)
Dept: INTERNAL MEDICINE CLINIC | Facility: CLINIC | Age: 37
End: 2022-07-02

## 2022-07-02 VITALS
DIASTOLIC BLOOD PRESSURE: 49 MMHG | BODY MASS INDEX: 27.82 KG/M2 | HEART RATE: 109 BPM | RESPIRATION RATE: 16 BRPM | OXYGEN SATURATION: 97 % | SYSTOLIC BLOOD PRESSURE: 92 MMHG | TEMPERATURE: 99 F | HEIGHT: 59 IN | WEIGHT: 138 LBS

## 2022-07-02 DIAGNOSIS — U07.1 COVID-19 VIRUS INFECTION: Primary | ICD-10-CM

## 2022-07-02 LAB — SARS-COV-2 RNA RESP QL NAA+PROBE: DETECTED

## 2022-07-02 PROCEDURE — 99213 OFFICE O/P EST LOW 20 MIN: CPT

## 2022-07-02 RX ORDER — NIRMATRELVIR AND RITONAVIR 300-100 MG
KIT ORAL
Qty: 30 TABLET | Refills: 0 | Status: SHIPPED | OUTPATIENT
Start: 2022-07-02 | End: 2022-07-07

## 2022-07-02 RX ORDER — BENZONATATE 100 MG/1
100 CAPSULE ORAL 3 TIMES DAILY PRN
Qty: 30 CAPSULE | Refills: 0 | Status: SHIPPED | OUTPATIENT
Start: 2022-07-02 | End: 2022-08-01

## 2022-07-02 RX ORDER — IBUPROFEN 600 MG/1
600 TABLET ORAL ONCE
Status: COMPLETED | OUTPATIENT
Start: 2022-07-02 | End: 2022-07-02

## 2022-07-02 RX ORDER — ACETAMINOPHEN 500 MG
1000 TABLET ORAL ONCE
Status: COMPLETED | OUTPATIENT
Start: 2022-07-02 | End: 2022-07-02

## 2022-07-02 NOTE — TELEPHONE ENCOUNTER
This is a late entry. I spoke to MILENA yesterday at roughly 6:30 PM.  She was having upper head and chest congestion. She wondered if it was allergies or a sinus infection. She related she is unvaccinated to [place]. I did tell her my concern regarding COVID. I discussed at the time of the call I did not feel she needed an antibiotic. She had tested negative for COVID with a home test.  After discussing possible treatment options she had the opportunity to go to urgent care to be evaluated. I thought this would be a good idea. She agreed. I do see that she this morning has been evaluated in urgent care and her COVID test came out positive. I tried calling her and her voicemail has not been set up yet. Addendum July 2 at 9:54 AM: I was able to speak to MILENA. She was evaluated in urgent care. She was given Paxlovid. She remained there for 2 hours. We did have a discussion regarding her 1year-old. Her 1year-old will stay with her mother till Monday. We did talk about the isolation recommendations for 5 days and then masking after that when she goes out  in public. Her daughter needs to return Monday to her home. MILENA will see if mother can take care of child for another day or so to allow more time for her to isolate. I advised that she call her daughter's pediatrician to alert the pediatrician of the circumstances of mother having Wilfredo. She verbalized understanding and agreement. ( Suleiman Solorio.  Joaquin Corbett )

## 2022-07-02 NOTE — ED INITIAL ASSESSMENT (HPI)
Pt presents today with c/o fever, body aches, nasal congestion, non-productive cough, headache, and sinus pressure since yesterday.

## 2022-07-03 ENCOUNTER — TELEPHONE (OUTPATIENT)
Dept: INTERNAL MEDICINE CLINIC | Facility: CLINIC | Age: 37
End: 2022-07-03

## 2022-07-03 RX ORDER — CODEINE PHOSPHATE AND GUAIFENESIN 10; 100 MG/5ML; MG/5ML
5 SOLUTION ORAL 4 TIMES DAILY PRN
Qty: 120 ML | Refills: 0 | COMMUNITY
Start: 2022-07-03

## 2022-07-03 NOTE — TELEPHONE ENCOUNTER
Patient called. She feels better but she still has a dry cough and is having some and did have some vomiting associated with it. Seems like the vomiting was from the cough. She has been taking the benzonatate. She feels better cough is from the postnasal drip. Talked about options. Since not getting any relief from benzonatate I called in [facility]. I also told her she can take Claritin-D and Flonase verbalized understanding. She did say that she is feeling better with her COVID. She was seen in immediate care and given Paxlovid.

## 2022-07-03 NOTE — TELEPHONE ENCOUNTER
Discussed with patient. She is profusely sweating. However she is feeling better. She is on a Paxlovid. She does not feel she has a temperature. Her nasal congestion is better. Her sore throat is better. She does not have a headache. Cough seems to be dry. No shortness of breath. I reassured her that the sweating is part of the COVID illness. She verbalized understanding. 20 she can call back anytime if symptoms change. We reviewed isolation days. Her symptoms started last Thursday. She knows about the 5 days of isolation and if she is fever free can walk in public with a mask.

## 2022-07-05 NOTE — TELEPHONE ENCOUNTER
Spoke with patient. Symptoms are improving. No fevers. No SOB. No chest pain/pressure/tightness. Only complaint is mild/intermittent dry cough. Discussed the following quarantine guidelines and instructions: According to CDC guidelines,  If symptomatic: you should stay home and quarantine for 5 full days. Day 1 is the first full day after your symptoms developed (24 hrs later). Wear a well-fitted mask if you must be around others in your home. End isolation after completing 5 full days, fever-free for 24 hours (without the use of fever-reducing medication), and your symptoms are improving. If you were severely ill with COVID-19 you should isolate for at least 10 days. Consult your doctor before ending isolation. Wear a well-fitted mask for 10 full days any time you are around others inside your home or in public. Do not go to places where you are unable to wear a mask. Avoid being around people who are at high risk. Do not travel until a full 10 days after your symptoms started. Monitor your symptoms at home and call the office with any new or worsening symptoms. ER is recommended should you develop shortness of breath, chest pain, high fever that's non-responsive to fever reducing medicine, or spo2 (oxygen level) <90% (if pulse ox is accesible). All questions answered.

## 2022-07-05 NOTE — TELEPHONE ENCOUNTER
Pt requests call back to review Covid protocols after her 5 days of quarantine  Pt states her voice mail is not working, she will have volume up on her phone & will keep it close to her    [phone]

## 2022-07-21 NOTE — TELEPHONE ENCOUNTER
To Dr. Noris Hinson to advise---    Spoke to pt, reports she has had a lot of life stressors recently. She sees a therapist and they recommended that she start on a medication for anxiety. Reports that she is having trouble sleeping and staying asleep because she feels that her \"mind is just always going\". Also reports feeling irritable and that she is worrying more. She has tried breathing exercises. Denies any SI/HI. She takes 15mg melatonin nightly for a while per patient, which doesn't seem to help. Reports she has had anxiety in the past and it seems to come and go due to life stressors. She reports she has previously taken zoloft which seemed to help at the time. Per chart review this was prescribed by another provider back in 2018. Pt looking for MD recommendations if she should restart zoloft or try another medication. She does not want to take anything that will \"knock her out\" because she has to take care of her daughter.

## 2022-07-21 NOTE — TELEPHONE ENCOUNTER
Patient is calling she see's a therapist, patient has a lot going on in her life. Patient is having difficulty sleeping, she is having trouble falling asleep and staying asleep. Her therapist recommends she starts on an anxiety medication.     Will Dr Chan Barahona prescribe something for her  [place]  [employer] [phone]

## 2022-07-22 NOTE — TELEPHONE ENCOUNTER
Virtual Visit/Telephone Note    Janette Fine verbally consents to a Virtual/Telephone Check-In service on 22  Patient understands and accepts financial responsibility for any deductible, co-insurance and/or co-pays associated with this service. Duration/time spent of the service: 20 Minutes of direct patient contact. 10 Minutes of chart review, documentation, medical decision making. HPI:   Janette Fine is a 31year old female who presents for complains of: Patient presents with: Anxiety: Anxiety Medication has not been working well, having trouble sleeping, speaking with her therapist and the therapist is recommending we go to more anti anxiety medication treatment. Patient has taken Zoloft in the past and has had I good response to this, also has taken BuSpar in the past, it has not been very strong but she did tolerate it well    Physical exam:   Patient sounds been her usual state of health, no obvious pain no obvious shortness of breath, not anxious sounding on the phone, telephone visit only    ASSESSMENT AND PLAN:   Janette Fine is a 31year old female who presents with the followin. Anxiety  I like the idea of starting a controller medicine like escitalopram, this should work over the next 2 to 4 weeks, the only negative people would see as sometimes it would heighten anxiety levels over the first few days you take it before it calms them over the long-term. If this happens, okay to take the BuSpar up to 3 times a day, this is a mild nerve calming agent, that should not make you sleepy, and it will be safe to drive and take care of children while taking these medications  Lets touch base with the therapist, so she can track her progress, and continue therapy  - escitalopram 10 MG Oral Tab; Take 1 tablet (10 mg total) by mouth daily. Dispense: 30 tablet; Refill: 5  - busPIRone 5 MG Oral Tab; Take 1 tablet (5 mg total) by mouth 3 (three) times daily as needed. Dispense: 40 tablet; Refill: 1    2. PTSD (post-traumatic stress disorder)  Lets ask the therapist about this as a diagnosis for you. Mervat Be DO  7/22/2022  3:23 PM    Spent 30 minutes obtaining history, evaluating patient, discussing treatment options, diet, exercise, review of available labs and radiology reports, and completing documentation.

## 2022-08-17 ENCOUNTER — IMAGING SERVICES (OUTPATIENT)
Dept: GENERAL RADIOLOGY | Age: 37
End: 2022-08-17
Attending: PHYSICIAN ASSISTANT

## 2022-08-17 DIAGNOSIS — S93.401A SPRAIN OF RIGHT ANKLE, UNSPECIFIED LIGAMENT, INITIAL ENCOUNTER: ICD-10-CM

## 2022-08-17 DIAGNOSIS — S93.401A SPRAIN OF RIGHT ANKLE, UNSPECIFIED LIGAMENT, INITIAL ENCOUNTER: Primary | ICD-10-CM

## 2022-08-17 PROCEDURE — 73610 X-RAY EXAM OF ANKLE: CPT | Performed by: RADIOLOGY

## 2022-09-26 ENCOUNTER — OFFICE VISIT (OUTPATIENT)
Dept: FAMILY MEDICINE CLINIC | Facility: CLINIC | Age: 37
End: 2022-09-26

## 2022-09-26 ENCOUNTER — TELEPHONE (OUTPATIENT)
Dept: INTERNAL MEDICINE CLINIC | Facility: CLINIC | Age: 37
End: 2022-09-26

## 2022-09-26 VITALS
HEART RATE: 86 BPM | RESPIRATION RATE: 20 BRPM | DIASTOLIC BLOOD PRESSURE: 74 MMHG | SYSTOLIC BLOOD PRESSURE: 120 MMHG | WEIGHT: 135 LBS | TEMPERATURE: 98 F | HEIGHT: 59 IN | OXYGEN SATURATION: 100 % | BODY MASS INDEX: 27.21 KG/M2

## 2022-09-26 DIAGNOSIS — H69.83 DYSFUNCTION OF BOTH EUSTACHIAN TUBES: Primary | ICD-10-CM

## 2022-09-26 DIAGNOSIS — H65.03 NON-RECURRENT ACUTE SEROUS OTITIS MEDIA OF BOTH EARS: ICD-10-CM

## 2022-09-26 PROCEDURE — 3008F BODY MASS INDEX DOCD: CPT

## 2022-09-26 PROCEDURE — 3078F DIAST BP <80 MM HG: CPT

## 2022-09-26 PROCEDURE — 99213 OFFICE O/P EST LOW 20 MIN: CPT

## 2022-09-26 PROCEDURE — 3074F SYST BP LT 130 MM HG: CPT

## 2022-09-26 NOTE — TELEPHONE ENCOUNTER
Patient is calling yesterday she became very congested & her ears are clogged  No other symptoms  Yesterday she went to a pumpkin patch & farm after leaving she became very congested. Patient has used Saline Spray, Claritin & Flonase none are helping. Can she have something called into [facility] or is there a suggestion of what she should try.     Phone [phone]

## 2022-09-26 NOTE — TELEPHONE ENCOUNTER
URI Triage:    Fever: Yes[]  No[]  [] Temperature:    [] Chills  [] Night sweats  [] Body aches    Cough: Yes[]  No[x]  [] Productive cough  [] Cough with exertion  [] Dry cough    Respiratory Symptoms: Yes[] No[x]  [] Wheezing  [] Pain with deep breathing  [] SOB with exertion  [] SOB at rest  [] Heavy breathing  [] Chest discomfort with deep breathing or coughing    GI Symptoms: Yes [] No[x]  [] Diarrhea  [] Nausea  [] Vomiting  [] Abdominal pain  [] Lack of appetite    Other symptoms:  [] Sore throat  [] Difficulty swallowing  [] Sinus pressure  [x] Nasal drainage (green, then yellow)  [x] Nasal congestion  [] Chest congestion  [] Head congestion  [] PND  [] Facial pain   [] Ear pain  [] Conjunctivitis  [] Headache  [] Fatigue  [] Weakness  [] Loss of sense of smell   [] Loss of sense of taste    [x]OTC Medications:Saline Spray, Claritin & Flonase     [] Any recent travel? [x] Any sick contacts? Daughter sick last week with cold sx. Seen by a provider. No strep or covid. DX: Viral infection. [] Positive Covid exposure (<6 feet, >15 min, no mask worn)  If yes, date of exposure:   Date of last contact:  When did the person develop symptoms:   When did they test positive:     [x] Previous history of Covid  If yes, when: 7/2022    Vaccinated: Yes  []   No [x]  Booster:  Yes  []  No [x]    Symptom onset: 9/26/22  Patient went to a pumpkin patch and returned home with nasal congestion. Has not taken her temperature, but denies feeling feverish. Has tried above otc medications with no improvement. She has not done any covid testing. To Dr. Libia Spaulding: patient is seeking recommendation/advise for sx. Patient was notified that this message will be routed to the physician to determine what their recommendation (s) would be. In the meantime, if they develop new or worsening symptoms, they were advised to call back or seek emergent evaluation at the ER.

## 2022-09-27 NOTE — TELEPHONE ENCOUNTER
I called her, attempted to leave a message, but unable to do so on her current voicemail that it is not set up.

## 2022-09-29 ENCOUNTER — OFFICE VISIT (OUTPATIENT)
Dept: FAMILY MEDICINE CLINIC | Facility: CLINIC | Age: 37
End: 2022-09-29

## 2022-09-29 VITALS — BODY MASS INDEX: 27 KG/M2 | WEIGHT: 135 LBS | OXYGEN SATURATION: 99 % | RESPIRATION RATE: 16 BRPM | HEART RATE: 71 BPM

## 2022-09-29 DIAGNOSIS — H66.001 NON-RECURRENT ACUTE SUPPURATIVE OTITIS MEDIA OF RIGHT EAR WITHOUT SPONTANEOUS RUPTURE OF TYMPANIC MEMBRANE: Primary | ICD-10-CM

## 2022-09-29 PROCEDURE — 99213 OFFICE O/P EST LOW 20 MIN: CPT

## 2022-09-29 RX ORDER — AMOXICILLIN AND CLAVULANATE POTASSIUM 875; 125 MG/1; MG/1
1 TABLET, FILM COATED ORAL 2 TIMES DAILY
Qty: 20 TABLET | Refills: 0 | Status: SHIPPED | OUTPATIENT
Start: 2022-09-29 | End: 2022-10-09

## 2022-09-29 RX ORDER — AZITHROMYCIN 250 MG/1
TABLET, FILM COATED ORAL
Qty: 6 TABLET | Refills: 0 | Status: SHIPPED | OUTPATIENT
Start: 2022-09-29 | End: 2022-10-04

## 2022-09-29 RX ORDER — METHYLPREDNISOLONE 4 MG/1
TABLET ORAL
Qty: 21 EACH | Refills: 0 | Status: SHIPPED | OUTPATIENT
Start: 2022-09-29

## 2022-09-29 NOTE — TELEPHONE ENCOUNTER
Pt returned call   Pt still not feeling well  Ears are really hurting today  There was fluid in her ears when at urgent care  Tasked to nursing

## 2022-09-29 NOTE — TELEPHONE ENCOUNTER
To Dr. Laney Higgins----    Spoke with pt. She was evaluated at a [facility] on Monday 9/26. She was told she had fluid in the ears and it could be allergy related. They recommended flonase, claritin D, and afrin. She felt slightly better the next day, then blew her nose and felt a \"pop\" in the right ear. Reports hearing on right side is very muffled and has ringing in the ears. The ear is painful, especially when swallowing and talking. Rates pain 6/10. Left ear is popping but not as bad as the right ear per pt. Still has nasal/sinus congestion. Denies fevers or chills. Pt looking for MD recommendations. Reports it is very difficult to answer the phone late in the evening. She is hoping for a callback as soon as possible.

## 2022-11-07 ENCOUNTER — TELEPHONE (OUTPATIENT)
Dept: INTERNAL MEDICINE CLINIC | Facility: CLINIC | Age: 37
End: 2022-11-07

## 2022-11-07 DIAGNOSIS — F41.9 ANXIETY: ICD-10-CM

## 2022-11-07 RX ORDER — ESCITALOPRAM OXALATE 20 MG/1
20 TABLET ORAL DAILY
Qty: 30 TABLET | Refills: 3 | Status: SHIPPED | OUTPATIENT
Start: 2022-11-07

## 2022-11-07 NOTE — TELEPHONE ENCOUNTER
Patient requesting refill for:  Buspirone   Escitalopram    Pt has been taking Buspirone and Escitalopram since July. Patient feels like she is not patient enough. Should dosage be increased? Pt feels exhausted, slower, not sad or depressed, less patient.     Wanted Dr Bertha Mcelroy to know prior to refill    Please call patient to discuss/advise    Tasked to nursing

## 2022-11-07 NOTE — TELEPHONE ENCOUNTER
I would recommend she goes up on the Lexapro to 20 mg daily, she can use up her existing medication and we can go from there. I did send in a new prescription for her. Let her know.

## 2022-12-02 ENCOUNTER — TELEPHONE (OUTPATIENT)
Dept: INTERNAL MEDICINE CLINIC | Facility: CLINIC | Age: 37
End: 2022-12-02

## 2022-12-02 DIAGNOSIS — J01.30 ACUTE NON-RECURRENT SPHENOIDAL SINUSITIS: Primary | ICD-10-CM

## 2022-12-02 PROCEDURE — 99442 PHONE E/M BY PHYS 11-20 MIN: CPT | Performed by: INTERNAL MEDICINE

## 2022-12-02 RX ORDER — AZITHROMYCIN 500 MG/1
500 TABLET, FILM COATED ORAL DAILY
Qty: 10 TABLET | Refills: 0 | Status: SHIPPED | OUTPATIENT
Start: 2022-12-02 | End: 2022-12-12

## 2022-12-02 NOTE — TELEPHONE ENCOUNTER
Virtual Visit/Telephone Note    Thuy Nagel verbally consents to a Virtual/Telephone Check-In service on 22  Patient understands and accepts financial responsibility for any deductible, co-insurance and/or co-pays associated with this service. Duration/time spent of the service: 20 Minutes of direct patient contact. 10 Minutes of chart review, documentation, medical decision making. HPI:   Thuy Nagel is a 30year old female who presents for complains of: Patient presents with:  Acute: Possible Sinus Infection, stable Saturday. Patient claims for 5 days now she has had some worsening sinus pressure and pain, she claims this was devoid of any fever or chills, she did have sick contact with her mother last week who had a sinus viral illness as well. She has been using decongestants, all week, seems to be minimally helping her, as she is getting worse she is getting headaches now, she is feeling this between the eyes mostly, nasal discharge is still clear from what she tells me. Physical exam:   Telephone visit only, no obvious pain no obvious shortness of breath, nasal sounding congested voice. ASSESSMENT AND PLAN:   Thuy Nagel is a 30year old female who presents with the followin. Acute non-recurrent sphenoidal sinusitis  I like the idea of starting on some antibiotics here, and seeing how this turns a corner, using the over-the-counter methods with decongestions, and sinus medications to continue to help this along. Steam showers, nasal sprays are fine as well, if we need to augment the treatment here, we would have to next go to steroids, but you would have to call me next week for that. I do not know if you are contagious since we are not sure whether you have a viral bacterial illness here, so lets try to perform strict hand hygiene, and keep this from spreading to others. - azithromycin 500 MG Oral Tab;  Take 1 tablet (500 mg total) by mouth daily for 10 days.  Dispense: 10 tablet; Refill: 0    Melba Pennington DO  12/2/2022  3:40 PM    Spent 30 minutes obtaining history, evaluating patient, discussing treatment options, diet, exercise, review of available labs and radiology reports, and completing documentation.

## 2022-12-02 NOTE — TELEPHONE ENCOUNTER
COVID triage:     Start of symptoms: 11/26/22 sinus symptoms, never went away, negative Covid test on 11/30/22     Fever:  [x]  No fever  []  Temperature  []  Chills   []  Night sweats     Cough: no  [] Productive cough  [] Cough with exertion  [] Dry cough     Breathing: no   [] Wheezing  [] Pain with deep breathing  [] SOB with exertion  [] SOB at rest  [] Heavy breathing     GI Symptoms: no. Eating and drinking normally. [] Diarrhea  [] Nausea  [] Vomiting  [] Abdominal pain  [] Lack of appetite     Other symptoms:  [] Sore throat  [] Difficulty swallowing  [x] Nasal drainage - clear   [x] Nasal congestion  [] PND  [x] Sinus pressure  [] Chest congestion  [x] Ear pain - clogged on both sides  [] Body aches  [] Loss of sense of smell   [] Loss of sense of taste  []Conjunctivitis  [x] Headache  [] Fatigue  [] Weakness     [x]OTC Medications: flonase, claritin     [] Any recent travel? [x] Any sick contacts? 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.

## 2022-12-02 NOTE — TELEPHONE ENCOUNTER
Patient is calling on 11/26 she is experiencing stuffiness, forehead bridge of nose hurts, pressure headaches, both ears are plugged  No other symptoms, she feels great just very stuffed up, she doesn't know if it is a sinus infection    Patient asking to have something called into [facility]    Patient took a covid test on 11/30 it was negative    Please call patient [phone]

## 2022-12-17 ENCOUNTER — TELEPHONE (OUTPATIENT)
Dept: INTERNAL MEDICINE CLINIC | Facility: CLINIC | Age: 37
End: 2022-12-17

## 2022-12-17 RX ORDER — OSELTAMIVIR PHOSPHATE 75 MG/1
75 CAPSULE ORAL 2 TIMES DAILY
Qty: 10 CAPSULE | Refills: 0 | Status: SHIPPED | OUTPATIENT
Start: 2022-12-17

## 2022-12-17 NOTE — TELEPHONE ENCOUNTER
Daughter has flu and pt would like tamiflu to prevent flu in herself. Currently has no symptons. Tried to discourage pt from starting tamiflu but she states that daughter's pediatrician strongly recommends that she take it. Tamiflu script sent to pharmacy. Pt aware that charge generated for this telephone encounter.

## 2023-01-03 ENCOUNTER — TELEPHONE (OUTPATIENT)
Dept: INTERNAL MEDICINE CLINIC | Facility: CLINIC | Age: 38
End: 2023-01-03

## 2023-01-03 LAB — AMB EXT COVID-19 RESULT: DETECTED

## 2023-01-03 RX ORDER — NIRMATRELVIR AND RITONAVIR 300-100 MG
3 KIT ORAL 2 TIMES DAILY
Qty: 30 EACH | Refills: 0 | Status: SHIPPED | OUTPATIENT
Start: 2023-01-03 | End: 2023-01-03

## 2023-01-03 RX ORDER — NIRMATRELVIR AND RITONAVIR 300-100 MG
3 KIT ORAL 2 TIMES DAILY
Qty: 30 EACH | Refills: 0 | Status: SHIPPED | OUTPATIENT
Start: 2023-01-03 | End: 2023-01-08

## 2023-01-03 NOTE — TELEPHONE ENCOUNTER
Janis Ramos is calling regarding script called in for Paxlovid    GFR  1st Symptoms   Both need to be on the prescription, please resend

## 2023-01-03 NOTE — TELEPHONE ENCOUNTER
To Dr. Jeison Warner to pt who reports symptoms started Saturday with h/a and decreased appetite. Currently c/o coughing, nasal congestion, body aches. Reports tested + for covid on home test yesterday. Denies breathing issues, chest pain, sob, or fever. Pt requesting prescription for covid.  derrick mireles [address] [place] [address].

## 2023-01-03 NOTE — TELEPHONE ENCOUNTER
Prescription for Paxlovid sent to pharmacy. Patient should hold her Lexapro while taking Paxlovid. Usual COVID precautions/recommendations.

## 2023-01-03 NOTE — TELEPHONE ENCOUNTER
Patient is calling after testing positive for covid yesterday with an at-home covid test. Patient is experiencing a cough, not coughing up anything (coughing so much it is making her gag), congestion, headache, achy, don't have an appetite. Denies any fever. Patient is not vaccinated. Hoping for something to help the symptoms.      [facility] # [phone]

## 2023-01-05 ENCOUNTER — TELEPHONE (OUTPATIENT)
Dept: INTERNAL MEDICINE CLINIC | Facility: CLINIC | Age: 38
End: 2023-01-05

## 2023-01-05 NOTE — TELEPHONE ENCOUNTER
To Dr Reynold Bamberger reports testing positive for Covid on 1/2, has no symptoms. States this is 3rd covind infection, remains unvaxed and non boostered Discussed CDC guidelines for quarantine. Veerbalized understanding. Also reports that 5 y/o  dtr tested + for covid, advised to call pediatrician for guidelines.

## 2023-01-05 NOTE — TELEPHONE ENCOUNTER
Please call patient  She tested positive for COVID on Monday, she is finishing her 5 days in quarantine  Pt daughter now tested positive for COVID  Can patient catch it again? Pt is considering being vaccinated, how long does she wait? Does she wait 3 months?   Please call to advise on protocol for patient  Tasked to nursing

## 2023-01-26 ENCOUNTER — HOSPITAL ENCOUNTER (OUTPATIENT)
Age: 38
Discharge: HOME | End: 2023-01-26
Payer: COMMERCIAL

## 2023-01-26 ENCOUNTER — APPOINTMENT (OUTPATIENT)
Dept: ULTRASOUND IMAGING | Age: 38
End: 2023-01-26
Attending: PHYSICIAN ASSISTANT
Payer: COMMERCIAL

## 2023-01-26 ENCOUNTER — OFFICE VISIT (OUTPATIENT)
Dept: FAMILY MEDICINE CLINIC | Facility: CLINIC | Age: 38
End: 2023-01-26
Payer: COMMERCIAL

## 2023-01-26 VITALS
HEART RATE: 74 BPM | SYSTOLIC BLOOD PRESSURE: 110 MMHG | RESPIRATION RATE: 18 BRPM | TEMPERATURE: 98 F | DIASTOLIC BLOOD PRESSURE: 60 MMHG | OXYGEN SATURATION: 100 %

## 2023-01-26 VITALS
HEIGHT: 59 IN | RESPIRATION RATE: 16 BRPM | DIASTOLIC BLOOD PRESSURE: 70 MMHG | WEIGHT: 146 LBS | TEMPERATURE: 98 F | SYSTOLIC BLOOD PRESSURE: 110 MMHG | HEART RATE: 72 BPM | BODY MASS INDEX: 29.43 KG/M2

## 2023-01-26 DIAGNOSIS — B36.0 PV (PITYRIASIS VERSICOLOR): ICD-10-CM

## 2023-01-26 DIAGNOSIS — R10.30 LOWER ABDOMINAL PAIN: ICD-10-CM

## 2023-01-26 DIAGNOSIS — R10.13 ABDOMINAL PAIN, EPIGASTRIC: ICD-10-CM

## 2023-01-26 DIAGNOSIS — M79.10 MYALGIA: ICD-10-CM

## 2023-01-26 DIAGNOSIS — E83.51 HYPOCALCEMIA: Primary | ICD-10-CM

## 2023-01-26 DIAGNOSIS — R11.0 NAUSEA: Primary | ICD-10-CM

## 2023-01-26 LAB
#MXD IC: 0.6 X10ˆ3/UL (ref 0.1–1)
ALBUMIN SERPL-MCNC: 3.9 G/DL (ref 3.4–5)
ALP LIVER SERPL-CCNC: 77 U/L
ALT SERPL-CCNC: 28 U/L
AST SERPL-CCNC: 19 U/L (ref 15–37)
BILIRUB DIRECT SERPL-MCNC: <0.1 MG/DL (ref 0–0.2)
BILIRUB SERPL-MCNC: 0.3 MG/DL (ref 0.1–2)
BUN BLD-MCNC: 11 MG/DL (ref 7–18)
CHLORIDE BLD-SCNC: 107 MMOL/L (ref 98–112)
CO2 BLD-SCNC: 17 MMOL/L (ref 21–32)
CONTROL LINE PRESENT WITH A CLEAR BACKGROUND (YES/NO): YES YES/NO
CREAT BLD-MCNC: 0.5 MG/DL
GFR SERPLBLD BASED ON 1.73 SQ M-ARVRAT: 125 ML/MIN/1.73M2 (ref 60–?)
GLUCOSE BLD-MCNC: 82 MG/DL (ref 70–99)
HCT VFR BLD AUTO: 35.7 %
HCT VFR BLD CALC: 40 %
HGB BLD-MCNC: 11.7 G/DL
ISTAT IONIZED CALCIUM FOR CHEM 8: 0.92 MMOL/L (ref 1.12–1.32)
LIPASE SERPL-CCNC: 196 U/L (ref 73–393)
LYMPHOCYTES # BLD AUTO: 1.5 X10ˆ3/UL (ref 1–4)
LYMPHOCYTES NFR BLD AUTO: 16.3 %
MCH RBC QN AUTO: 31.3 PG (ref 26–34)
MCHC RBC AUTO-ENTMCNC: 32.8 G/DL (ref 31–37)
MCV RBC AUTO: 95.5 FL (ref 80–100)
MIXED CELL %: 6.1 %
NEUTROPHILS # BLD AUTO: 7.4 X10ˆ3/UL (ref 1.5–7.7)
NEUTROPHILS NFR BLD AUTO: 77.6 %
PLATELET # BLD AUTO: 217 X10ˆ3/UL (ref 150–450)
POCT BILIRUBIN URINE: NEGATIVE
POCT BLOOD URINE: NEGATIVE
POCT GLUCOSE URINE: NEGATIVE MG/DL
POCT KETONE URINE: 15 MG/DL
POCT LEUKOCYTE ESTERASE URINE: NEGATIVE
POCT NITRITE URINE: NEGATIVE
POCT PH URINE: 5.5 (ref 5–8)
POCT PROTEIN URINE: NEGATIVE MG/DL
POCT SPECIFIC GRAVITY URINE: 1
POCT URINE CLARITY: CLEAR
POCT URINE COLOR: YELLOW
POCT UROBILINOGEN URINE: 0.2 MG/DL
POTASSIUM BLD-SCNC: 3.9 MMOL/L (ref 3.6–5.1)
PROT SERPL-MCNC: 7.7 G/DL (ref 6.4–8.2)
RBC # BLD AUTO: 3.74 X10ˆ6/UL
SODIUM BLD-SCNC: 135 MMOL/L (ref 136–145)
WBC # BLD AUTO: 9.5 X10ˆ3/UL (ref 4–11)

## 2023-01-26 PROCEDURE — 85025 COMPLETE CBC W/AUTO DIFF WBC: CPT | Performed by: PHYSICIAN ASSISTANT

## 2023-01-26 PROCEDURE — 99215 OFFICE O/P EST HI 40 MIN: CPT

## 2023-01-26 PROCEDURE — 99214 OFFICE O/P EST MOD 30 MIN: CPT

## 2023-01-26 PROCEDURE — 76700 US EXAM ABDOM COMPLETE: CPT | Performed by: PHYSICIAN ASSISTANT

## 2023-01-26 PROCEDURE — 93970 EXTREMITY STUDY: CPT | Performed by: PHYSICIAN ASSISTANT

## 2023-01-26 PROCEDURE — 3078F DIAST BP <80 MM HG: CPT | Performed by: NURSE PRACTITIONER

## 2023-01-26 PROCEDURE — 81025 URINE PREGNANCY TEST: CPT | Performed by: NURSE PRACTITIONER

## 2023-01-26 PROCEDURE — 81002 URINALYSIS NONAUTO W/O SCOPE: CPT | Performed by: PHYSICIAN ASSISTANT

## 2023-01-26 PROCEDURE — 83690 ASSAY OF LIPASE: CPT | Performed by: PHYSICIAN ASSISTANT

## 2023-01-26 PROCEDURE — 80047 BASIC METABLC PNL IONIZED CA: CPT

## 2023-01-26 PROCEDURE — 3008F BODY MASS INDEX DOCD: CPT | Performed by: NURSE PRACTITIONER

## 2023-01-26 PROCEDURE — 80076 HEPATIC FUNCTION PANEL: CPT | Performed by: PHYSICIAN ASSISTANT

## 2023-01-26 PROCEDURE — 36415 COLL VENOUS BLD VENIPUNCTURE: CPT

## 2023-01-26 PROCEDURE — 3074F SYST BP LT 130 MM HG: CPT | Performed by: NURSE PRACTITIONER

## 2023-01-26 RX ORDER — ONDANSETRON 4 MG/1
4 TABLET, ORALLY DISINTEGRATING ORAL EVERY 4 HOURS PRN
Qty: 10 TABLET | Refills: 0 | Status: SHIPPED | OUTPATIENT
Start: 2023-01-26 | End: 2023-02-02

## 2023-01-26 RX ORDER — KETOCONAZOLE 20 MG/G
1 CREAM TOPICAL DAILY
Qty: 1 EACH | Refills: 0 | Status: SHIPPED | OUTPATIENT
Start: 2023-01-26 | End: 2023-02-09

## 2023-01-26 NOTE — DISCHARGE INSTRUCTIONS
Follow-up with your primary care physician if you develop worsening symptoms you should go to the emergency room.   Stick to a bland diet take antinausea medication as directed and continue to hydrate

## 2023-01-26 NOTE — ED INITIAL ASSESSMENT (HPI)
C/o dizziness and nausea for 3 days. Pt sent here by [facility] for CT scan. Pt reports slightest movement made her very nausated. Pt report fatigue, swollen to leg. 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

## 2023-01-27 ENCOUNTER — OFFICE VISIT (OUTPATIENT)
Dept: INTERNAL MEDICINE CLINIC | Facility: CLINIC | Age: 38
End: 2023-01-27

## 2023-01-27 VITALS
SYSTOLIC BLOOD PRESSURE: 112 MMHG | OXYGEN SATURATION: 97 % | HEIGHT: 59 IN | TEMPERATURE: 98 F | DIASTOLIC BLOOD PRESSURE: 68 MMHG | BODY MASS INDEX: 28.63 KG/M2 | WEIGHT: 142 LBS | HEART RATE: 89 BPM

## 2023-01-27 DIAGNOSIS — R52 PAIN: ICD-10-CM

## 2023-01-27 DIAGNOSIS — E83.51 HYPOCALCEMIA: Primary | ICD-10-CM

## 2023-01-27 DIAGNOSIS — R11.0 NAUSEA: ICD-10-CM

## 2023-01-27 DIAGNOSIS — R25.2 CRAMP IN MUSCLE: ICD-10-CM

## 2023-01-27 DIAGNOSIS — R42 VERTIGO: ICD-10-CM

## 2023-01-27 DIAGNOSIS — R53.83 OTHER FATIGUE: ICD-10-CM

## 2023-01-27 PROCEDURE — 3074F SYST BP LT 130 MM HG: CPT | Performed by: INTERNAL MEDICINE

## 2023-01-27 PROCEDURE — 99214 OFFICE O/P EST MOD 30 MIN: CPT | Performed by: INTERNAL MEDICINE

## 2023-01-27 PROCEDURE — 3078F DIAST BP <80 MM HG: CPT | Performed by: INTERNAL MEDICINE

## 2023-01-27 PROCEDURE — 3008F BODY MASS INDEX DOCD: CPT | Performed by: INTERNAL MEDICINE

## 2023-01-27 RX ORDER — MAGNESIUM CHLORIDE 71.5 G/G
2 TABLET ORAL DAILY
Qty: 30 TABLET | Refills: 0 | Status: SHIPPED | OUTPATIENT
Start: 2023-01-27

## 2023-01-27 RX ORDER — MELOXICAM 15 MG/1
15 TABLET ORAL DAILY PRN
Qty: 30 TABLET | Refills: 1 | Status: SHIPPED | OUTPATIENT
Start: 2023-01-27

## 2023-01-27 NOTE — PATIENT INSTRUCTIONS
1. Hypocalcemia  Lets do some lab work next week after following the plan for restoring the calcium, and rehydration  I want you to take Slow-Mag 2 tablets daily for the next 15 days  Want you to rehydrate with at least 1 L of electrolyte solution, amongst another 64 ounces of water spit up throughout the day  I like the idea of Pepcid Complete tablets at least 3 times a day 1 tablet for the next 5 to 7 days  And doing the lab work next week should show some restoration if you are feeling better  You can continue the vitamin D, off the zinc for now  - CBC WITH DIFFERENTIAL WITH PLATELET; Future  - COMP METABOLIC PANEL (14); Future  - CALCIUM IONIZED-OUT PATIENT; Future  - Magnesium Cl-Calcium Carbonate (SLOW-MAG) 71.5-119 MG Oral Tab EC; Take 2 tablets by mouth daily. Dispense: 30 tablet; Refill: 0    2. Other fatigue  This should lift    3. Vertigo  This sounds to be temporary, let me know if this recurs    4. Nausea  Okay to use the Zofran if you are still feeling nauseous after the above regimen    5. Pain  Unknown cause here, would expect a calcium shift to be giving you some trouble in the nerves, this should resolve as long as we replace the electrolytes    6. Cramp in muscle  Lets use magnesium here as above. - CBC WITH DIFFERENTIAL WITH PLATELET; Future  - MAGNESIUM;  Future

## 2023-01-31 ENCOUNTER — LAB ENCOUNTER (OUTPATIENT)
Dept: LAB | Facility: HOSPITAL | Age: 38
End: 2023-01-31
Attending: INTERNAL MEDICINE
Payer: COMMERCIAL

## 2023-01-31 DIAGNOSIS — E83.51 HYPOCALCEMIA: ICD-10-CM

## 2023-01-31 DIAGNOSIS — R25.2 CRAMP IN MUSCLE: ICD-10-CM

## 2023-01-31 LAB
ALBUMIN SERPL-MCNC: 3.2 G/DL (ref 3.4–5)
ALBUMIN/GLOB SERPL: 0.9 {RATIO} (ref 1–2)
ALP LIVER SERPL-CCNC: 75 U/L
ALT SERPL-CCNC: 27 U/L
ANION GAP SERPL CALC-SCNC: 3 MMOL/L (ref 0–18)
AST SERPL-CCNC: 18 U/L (ref 15–37)
BASOPHILS # BLD AUTO: 0.08 X10(3) UL (ref 0–0.2)
BASOPHILS NFR BLD AUTO: 1 %
BILIRUB SERPL-MCNC: 0.2 MG/DL (ref 0.1–2)
BUN BLD-MCNC: 16 MG/DL (ref 7–18)
CALCIUM BLD-MCNC: 8.6 MG/DL (ref 8.5–10.1)
CHLORIDE SERPL-SCNC: 108 MMOL/L (ref 98–112)
CO2 SERPL-SCNC: 26 MMOL/L (ref 21–32)
CREAT BLD-MCNC: 0.79 MG/DL
EOSINOPHIL # BLD AUTO: 0.44 X10(3) UL (ref 0–0.7)
EOSINOPHIL NFR BLD AUTO: 5.7 %
ERYTHROCYTE [DISTWIDTH] IN BLOOD BY AUTOMATED COUNT: 11.4 %
FASTING STATUS PATIENT QL REPORTED: NO
GFR SERPLBLD BASED ON 1.73 SQ M-ARVRAT: 100 ML/MIN/1.73M2 (ref 60–?)
GLOBULIN PLAS-MCNC: 3.7 G/DL (ref 2.8–4.4)
GLUCOSE BLD-MCNC: 113 MG/DL (ref 70–99)
HCT VFR BLD AUTO: 35.4 %
HGB BLD-MCNC: 12.1 G/DL
IMM GRANULOCYTES # BLD AUTO: 0.01 X10(3) UL (ref 0–1)
IMM GRANULOCYTES NFR BLD: 0.1 %
LYMPHOCYTES # BLD AUTO: 3.1 X10(3) UL (ref 1–4)
LYMPHOCYTES NFR BLD AUTO: 39.8 %
MAGNESIUM SERPL-MCNC: 2.1 MG/DL (ref 1.6–2.6)
MCH RBC QN AUTO: 32.1 PG (ref 26–34)
MCHC RBC AUTO-ENTMCNC: 34.2 G/DL (ref 31–37)
MCV RBC AUTO: 93.9 FL
MONOCYTES # BLD AUTO: 0.77 X10(3) UL (ref 0.1–1)
MONOCYTES NFR BLD AUTO: 9.9 %
NEUTROPHILS # BLD AUTO: 3.38 X10 (3) UL (ref 1.5–7.7)
NEUTROPHILS # BLD AUTO: 3.38 X10(3) UL (ref 1.5–7.7)
NEUTROPHILS NFR BLD AUTO: 43.5 %
OSMOLALITY SERPL CALC.SUM OF ELEC: 286 MOSM/KG (ref 275–295)
PLATELET # BLD AUTO: 278 10(3)UL (ref 150–450)
POTASSIUM SERPL-SCNC: 4 MMOL/L (ref 3.5–5.1)
PROT SERPL-MCNC: 6.9 G/DL (ref 6.4–8.2)
RBC # BLD AUTO: 3.77 X10(6)UL
SODIUM SERPL-SCNC: 137 MMOL/L (ref 136–145)
WBC # BLD AUTO: 7.8 X10(3) UL (ref 4–11)

## 2023-01-31 PROCEDURE — 36415 COLL VENOUS BLD VENIPUNCTURE: CPT

## 2023-01-31 PROCEDURE — 82330 ASSAY OF CALCIUM: CPT

## 2023-01-31 PROCEDURE — 83735 ASSAY OF MAGNESIUM: CPT

## 2023-01-31 PROCEDURE — 85025 COMPLETE CBC W/AUTO DIFF WBC: CPT

## 2023-01-31 PROCEDURE — 80053 COMPREHEN METABOLIC PANEL: CPT

## 2023-02-02 ENCOUNTER — TELEPHONE (OUTPATIENT)
Dept: INTERNAL MEDICINE CLINIC | Facility: CLINIC | Age: 38
End: 2023-02-02

## 2023-02-03 LAB
CALCIUM IONIZED PH 7.4: 1.23 MMOL/L
CALCIUM IONIZED, SERUM: 1.25 MMOL/L

## 2023-02-03 NOTE — TELEPHONE ENCOUNTER
Patient calling back returning Dr. Sam Rodriguez call. She is at [facility], best time to call her back is  1:30-2:30 then after 6:00.

## 2023-02-10 NOTE — TELEPHONE ENCOUNTER
Noted spoke with pt and seems to be improving on the regimen and will stay with the plan.   No changes negative

## 2023-02-15 ENCOUNTER — TELEPHONE (OUTPATIENT)
Dept: INTERNAL MEDICINE CLINIC | Facility: CLINIC | Age: 38
End: 2023-02-15

## 2023-02-15 RX ORDER — AZITHROMYCIN 250 MG/1
TABLET, FILM COATED ORAL
Qty: 6 TABLET | Refills: 0 | Status: SHIPPED | OUTPATIENT
Start: 2023-02-15 | End: 2023-02-20

## 2023-02-15 NOTE — TELEPHONE ENCOUNTER
Patient calling for prescription to help with sinus congestion. Has been sick since Sunday. Headache between eyes, sinus congestion,   Taking Claritin and cleaning nose. Neither is helping relieve the pressure. Nose is stuffed, hard to blow stuff out. When she does get some out it is light green and yellow. From nose up she is stuffed up. No cough, no sore throat, no bad aches. Took home Covid test Sunday, was negative.     Nannette Liriano    Best call back number [phone]

## 2023-02-15 NOTE — TELEPHONE ENCOUNTER
Respiratory infection triage:    Fever:  [x]  No fever  []  Fever>100.4    Cough:  [] Tight cough  [] Cough with exertion  [] Dry cough  [] Sputum production, Color:     Breathing:  [] Mild shortness of breath interfering with activity  [] Wheezing  [] Pain with deep breathing  [] Using inhaler    Other symptoms:  [] Sore throat  [] Difficulty swallowing  [] Nasal drainage/congestion  [x] Sinus congestion/pressure:yellowish greenish discharge [] Ear pain  [] Body aches  [] Poor appetite  [] Loss of sense of smell   [] Loss of sense of taste  [x]Conjunctivitis?:itchy and watery  [] Any recent travel? [] Any sick contacts? 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 Would be using [facility] - to           Notified patient that we will route this message to the doctor and see what their recommendations would be. In the meantime, if anything worsens, they were advised to call back or seek emergent evaluation.

## 2023-02-15 NOTE — TELEPHONE ENCOUNTER
Would recommend zyrtec and flonase daily; would also recommend steam 2- 3 times a day  I also sent in zpak which she can start in a few days if symptoms do not improve

## 2023-03-30 ENCOUNTER — TELEPHONE (OUTPATIENT)
Dept: INTERNAL MEDICINE CLINIC | Facility: CLINIC | Age: 38
End: 2023-03-30

## 2023-03-30 DIAGNOSIS — F41.9 ANXIETY: ICD-10-CM

## 2023-04-19 NOTE — TELEPHONE ENCOUNTER
Pt called to check status on refill  She is completely out of medication   Pt did not see my chart message   - Pt advises she feels good, well rested, no side effects     Please send refill today

## 2023-04-20 RX ORDER — ESCITALOPRAM OXALATE 20 MG/1
20 TABLET ORAL DAILY
Qty: 90 TABLET | Refills: 3 | Status: SHIPPED | OUTPATIENT
Start: 2023-04-20 | End: 2023-04-22

## 2023-04-20 NOTE — TELEPHONE ENCOUNTER
To Dr. Noris Hinson---    Lexapro dose increased from 10mg daily to 20mg daily on 11/7/22. Pt reports doing well on increased dose.  Please confirm if ok to refill

## 2023-04-22 ENCOUNTER — TELEPHONE (OUTPATIENT)
Dept: INTERNAL MEDICINE CLINIC | Facility: CLINIC | Age: 38
End: 2023-04-22

## 2023-04-22 DIAGNOSIS — F41.9 ANXIETY: ICD-10-CM

## 2023-04-22 RX ORDER — ESCITALOPRAM OXALATE 20 MG/1
20 TABLET ORAL DAILY
Qty: 90 TABLET | Refills: 0 | Status: SHIPPED | OUTPATIENT
Start: 2023-04-22

## 2023-04-22 NOTE — TELEPHONE ENCOUNTER
Patient called. Requesting refill of Lexapro. She gets 3 months at a time. This is for anxiety. She declines any depressive symptoms.

## 2023-04-24 RX ORDER — ESCITALOPRAM OXALATE 20 MG/1
20 TABLET ORAL DAILY
Qty: 30 TABLET | Refills: 0 | OUTPATIENT
Start: 2023-04-24

## 2023-05-22 ENCOUNTER — TELEPHONE (OUTPATIENT)
Dept: INTERNAL MEDICINE CLINIC | Facility: CLINIC | Age: 38
End: 2023-05-22

## 2023-05-22 ENCOUNTER — OFFICE VISIT (OUTPATIENT)
Dept: INTERNAL MEDICINE CLINIC | Facility: CLINIC | Age: 38
End: 2023-05-22

## 2023-05-22 VITALS
BODY MASS INDEX: 30.04 KG/M2 | SYSTOLIC BLOOD PRESSURE: 110 MMHG | HEIGHT: 59 IN | TEMPERATURE: 98 F | OXYGEN SATURATION: 98 % | WEIGHT: 149 LBS | DIASTOLIC BLOOD PRESSURE: 80 MMHG | HEART RATE: 70 BPM

## 2023-05-22 DIAGNOSIS — J02.9 SORE THROAT: Primary | ICD-10-CM

## 2023-05-22 PROCEDURE — 3079F DIAST BP 80-89 MM HG: CPT | Performed by: INTERNAL MEDICINE

## 2023-05-22 PROCEDURE — 99214 OFFICE O/P EST MOD 30 MIN: CPT | Performed by: INTERNAL MEDICINE

## 2023-05-22 PROCEDURE — 3008F BODY MASS INDEX DOCD: CPT | Performed by: INTERNAL MEDICINE

## 2023-05-22 PROCEDURE — 3074F SYST BP LT 130 MM HG: CPT | Performed by: INTERNAL MEDICINE

## 2023-05-22 NOTE — TELEPHONE ENCOUNTER
URI Triage:     Fever: Yes[]               No[x]                 Unknown[]  [] Temperature:   [] Chills   [] Night sweats  [] Body aches     Cough: Yes[]              No[x]  [] Productive cough  [] Cough with exertion  [] Dry cough     Respiratory Symptoms: Yes[]          No[x]  [] Wheezing  [] Pain with deep breathing  [] SOB with exertion  [] SOB at rest  [] Heavy breathing  [] Chest discomfort with deep breathing or coughing     GI Symptoms: Yes []            No[x]  [] Diarrhea  [] Nausea  [] Vomiting  [] Abdominal pain  [] Lack of appetite     Other symptoms:  [x] Sore throat  [] Difficulty swallowing  [x] Sinus pressure  [x] Nasal drainage  [x] Nasal congestion  [] Chest congestion  [x] Head congestion  [x] PND  [x] Facial pain   [] Ear pain  [] Conjunctivitis  [x] Headache  [] Fatigue  [] Weakness  [] Loss of sense of smell   [] Loss of sense of taste     [x]OTC Medications:  Claritin   Flonase  Ibuprofen      Vaccinated: Yes  [x]   No []  Booster:  Yes  []  No [x]     Symptom onset:  Yesterday 5/23        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 Tested negative for covid this morning. Requesting office visit. Scheduled with an associate. Instructed to wear a mask while in clinic. Pt verbalized understanding.

## 2023-05-22 NOTE — TELEPHONE ENCOUNTER
Patient is calling to speak with a nurse. Patient states she woke up with a sore throat. Throat is scratchy and sore, patient also has no voice. Patient tested negative for covid.       # [phone] in [facility]

## 2023-05-23 LAB — SARS-COV-2 RNA RESP QL NAA+PROBE: NOT DETECTED

## 2023-06-12 ENCOUNTER — HOSPITAL ENCOUNTER (EMERGENCY)
Facility: HOSPITAL | Age: 38
Discharge: HOME | End: 2023-06-12
Attending: EMERGENCY MEDICINE
Payer: COMMERCIAL

## 2023-06-12 ENCOUNTER — TELEPHONE (OUTPATIENT)
Dept: INTERNAL MEDICINE CLINIC | Facility: CLINIC | Age: 38
End: 2023-06-12

## 2023-06-12 ENCOUNTER — APPOINTMENT (OUTPATIENT)
Dept: CT IMAGING | Facility: HOSPITAL | Age: 38
End: 2023-06-12
Attending: EMERGENCY MEDICINE
Payer: COMMERCIAL

## 2023-06-12 VITALS
HEART RATE: 68 BPM | HEIGHT: 59 IN | OXYGEN SATURATION: 98 % | SYSTOLIC BLOOD PRESSURE: 112 MMHG | BODY MASS INDEX: 27.82 KG/M2 | WEIGHT: 138 LBS | DIASTOLIC BLOOD PRESSURE: 68 MMHG | TEMPERATURE: 98 F | RESPIRATION RATE: 18 BRPM

## 2023-06-12 DIAGNOSIS — K52.9 ACUTE GASTROENTERITIS: Primary | ICD-10-CM

## 2023-06-12 LAB
ALBUMIN SERPL-MCNC: 3.9 G/DL (ref 3.4–5)
ALBUMIN/GLOB SERPL: 1 {RATIO} (ref 1–2)
ALP LIVER SERPL-CCNC: 77 U/L
ALT SERPL-CCNC: 27 U/L
ANION GAP SERPL CALC-SCNC: 5 MMOL/L (ref 0–18)
AST SERPL-CCNC: 17 U/L (ref 15–37)
B-HCG UR QL: NEGATIVE
BASOPHILS # BLD AUTO: 0.06 X10(3) UL (ref 0–0.2)
BASOPHILS NFR BLD AUTO: 0.5 %
BILIRUB SERPL-MCNC: 0.2 MG/DL (ref 0.1–2)
BILIRUB UR QL STRIP.AUTO: NEGATIVE
BUN BLD-MCNC: 14 MG/DL (ref 7–18)
CALCIUM BLD-MCNC: 9.2 MG/DL (ref 8.5–10.1)
CHLORIDE SERPL-SCNC: 106 MMOL/L (ref 98–112)
CLARITY UR REFRACT.AUTO: CLEAR
CO2 SERPL-SCNC: 25 MMOL/L (ref 21–32)
COLOR UR AUTO: YELLOW
CREAT BLD-MCNC: 0.77 MG/DL
EOSINOPHIL # BLD AUTO: 0.2 X10(3) UL (ref 0–0.7)
EOSINOPHIL NFR BLD AUTO: 1.7 %
ERYTHROCYTE [DISTWIDTH] IN BLOOD BY AUTOMATED COUNT: 12.1 %
GFR SERPLBLD BASED ON 1.73 SQ M-ARVRAT: 103 ML/MIN/1.73M2 (ref 60–?)
GLOBULIN PLAS-MCNC: 3.8 G/DL (ref 2.8–4.4)
GLUCOSE BLD-MCNC: 107 MG/DL (ref 70–99)
GLUCOSE UR STRIP.AUTO-MCNC: NEGATIVE MG/DL
HCT VFR BLD AUTO: 39.7 %
HGB BLD-MCNC: 13.4 G/DL
HYALINE CASTS #/AREA URNS AUTO: PRESENT /LPF
IMM GRANULOCYTES # BLD AUTO: 0.03 X10(3) UL (ref 0–1)
IMM GRANULOCYTES NFR BLD: 0.3 %
KETONES UR STRIP.AUTO-MCNC: NEGATIVE MG/DL
LEUKOCYTE ESTERASE UR QL STRIP.AUTO: NEGATIVE
LIPASE SERPL-CCNC: 32 U/L (ref 13–75)
LYMPHOCYTES # BLD AUTO: 2.96 X10(3) UL (ref 1–4)
LYMPHOCYTES NFR BLD AUTO: 25.7 %
MCH RBC QN AUTO: 31.2 PG (ref 26–34)
MCHC RBC AUTO-ENTMCNC: 33.8 G/DL (ref 31–37)
MCV RBC AUTO: 92.3 FL
MONOCYTES # BLD AUTO: 0.82 X10(3) UL (ref 0.1–1)
MONOCYTES NFR BLD AUTO: 7.1 %
NEUTROPHILS # BLD AUTO: 7.46 X10 (3) UL (ref 1.5–7.7)
NEUTROPHILS # BLD AUTO: 7.46 X10(3) UL (ref 1.5–7.7)
NEUTROPHILS NFR BLD AUTO: 64.7 %
NITRITE UR QL STRIP.AUTO: NEGATIVE
OSMOLALITY SERPL CALC.SUM OF ELEC: 283 MOSM/KG (ref 275–295)
PH UR STRIP.AUTO: 6 [PH] (ref 5–8)
PLATELET # BLD AUTO: 365 10(3)UL (ref 150–450)
POTASSIUM SERPL-SCNC: 3.6 MMOL/L (ref 3.5–5.1)
PROT SERPL-MCNC: 7.7 G/DL (ref 6.4–8.2)
PROT UR STRIP.AUTO-MCNC: NEGATIVE MG/DL
RBC # BLD AUTO: 4.3 X10(6)UL
SODIUM SERPL-SCNC: 136 MMOL/L (ref 136–145)
SP GR UR STRIP.AUTO: 1.01 (ref 1–1.03)
UROBILINOGEN UR STRIP.AUTO-MCNC: <2 MG/DL
WBC # BLD AUTO: 11.5 X10(3) UL (ref 4–11)

## 2023-06-12 PROCEDURE — 83690 ASSAY OF LIPASE: CPT | Performed by: EMERGENCY MEDICINE

## 2023-06-12 PROCEDURE — 96375 TX/PRO/DX INJ NEW DRUG ADDON: CPT

## 2023-06-12 PROCEDURE — 80053 COMPREHEN METABOLIC PANEL: CPT | Performed by: EMERGENCY MEDICINE

## 2023-06-12 PROCEDURE — 96374 THER/PROPH/DIAG INJ IV PUSH: CPT

## 2023-06-12 PROCEDURE — 96376 TX/PRO/DX INJ SAME DRUG ADON: CPT

## 2023-06-12 PROCEDURE — 85025 COMPLETE CBC W/AUTO DIFF WBC: CPT | Performed by: EMERGENCY MEDICINE

## 2023-06-12 PROCEDURE — 81025 URINE PREGNANCY TEST: CPT

## 2023-06-12 PROCEDURE — 81001 URINALYSIS AUTO W/SCOPE: CPT | Performed by: EMERGENCY MEDICINE

## 2023-06-12 PROCEDURE — 96361 HYDRATE IV INFUSION ADD-ON: CPT

## 2023-06-12 PROCEDURE — 74177 CT ABD & PELVIS W/CONTRAST: CPT | Performed by: EMERGENCY MEDICINE

## 2023-06-12 PROCEDURE — 99284 EMERGENCY DEPT VISIT MOD MDM: CPT

## 2023-06-12 PROCEDURE — 99285 EMERGENCY DEPT VISIT HI MDM: CPT

## 2023-06-12 RX ORDER — MORPHINE SULFATE 4 MG/ML
4 INJECTION, SOLUTION INTRAMUSCULAR; INTRAVENOUS ONCE
Status: COMPLETED | OUTPATIENT
Start: 2023-06-12 | End: 2023-06-12

## 2023-06-12 RX ORDER — ONDANSETRON 2 MG/ML
4 INJECTION INTRAMUSCULAR; INTRAVENOUS ONCE
Status: COMPLETED | OUTPATIENT
Start: 2023-06-12 | End: 2023-06-12

## 2023-06-12 RX ORDER — DICYCLOMINE HCL 20 MG
20 TABLET ORAL 4 TIMES DAILY PRN
Qty: 30 TABLET | Refills: 0 | Status: SHIPPED | OUTPATIENT
Start: 2023-06-12 | End: 2023-07-12

## 2023-06-12 RX ORDER — ONDANSETRON 4 MG/1
4 TABLET, ORALLY DISINTEGRATING ORAL EVERY 4 HOURS PRN
Qty: 10 TABLET | Refills: 0 | Status: SHIPPED | OUTPATIENT
Start: 2023-06-12 | End: 2023-06-19

## 2023-06-12 NOTE — ED INITIAL ASSESSMENT (HPI)
Pt reports sharp intermittent right lower abdominal pain that started yesterday. Pt reports nausea and diarrhea. Pt denies urinary symptoms. Denies taking pain medicine PTA.

## 2023-06-12 NOTE — TELEPHONE ENCOUNTER
Received call a little bit after 4 AM.  Patient indicated that she had a headache. Ringing in the ears. Also some abdominal pain. She was not able to sleep with the pain. We discussed this. I discussed she should be evaluated in emergency room and she verbalized understanding.

## 2023-07-17 ENCOUNTER — TELEPHONE (OUTPATIENT)
Dept: INTERNAL MEDICINE CLINIC | Facility: CLINIC | Age: 38
End: 2023-07-17

## 2023-07-17 ENCOUNTER — LAB ENCOUNTER (OUTPATIENT)
Dept: LAB | Age: 38
End: 2023-07-17
Attending: INTERNAL MEDICINE
Payer: COMMERCIAL

## 2023-07-17 ENCOUNTER — OFFICE VISIT (OUTPATIENT)
Dept: INTERNAL MEDICINE CLINIC | Facility: CLINIC | Age: 38
End: 2023-07-17

## 2023-07-17 VITALS
DIASTOLIC BLOOD PRESSURE: 60 MMHG | RESPIRATION RATE: 16 BRPM | OXYGEN SATURATION: 98 % | TEMPERATURE: 98 F | SYSTOLIC BLOOD PRESSURE: 106 MMHG | HEART RATE: 81 BPM | HEIGHT: 59 IN | WEIGHT: 148 LBS | BODY MASS INDEX: 29.84 KG/M2

## 2023-07-17 DIAGNOSIS — R19.5 LOOSE STOOLS: ICD-10-CM

## 2023-07-17 DIAGNOSIS — Z83.79 FAMILY HISTORY OF CELIAC DISEASE: ICD-10-CM

## 2023-07-17 DIAGNOSIS — R10.84 GENERALIZED ABDOMINAL PAIN: Primary | ICD-10-CM

## 2023-07-17 LAB
ALBUMIN SERPL-MCNC: 3.7 G/DL (ref 3.4–5)
ALBUMIN/GLOB SERPL: 0.9 {RATIO} (ref 1–2)
ALP LIVER SERPL-CCNC: 81 U/L
ALT SERPL-CCNC: 28 U/L
ANION GAP SERPL CALC-SCNC: 8 MMOL/L (ref 0–18)
AST SERPL-CCNC: 18 U/L (ref 15–37)
BASOPHILS # BLD AUTO: 0.05 X10(3) UL (ref 0–0.2)
BASOPHILS NFR BLD AUTO: 0.7 %
BILIRUB SERPL-MCNC: 0.3 MG/DL (ref 0.1–2)
BILIRUB UR QL: NEGATIVE
BUN BLD-MCNC: 16 MG/DL (ref 7–18)
BUN/CREAT SERPL: 19.3 (ref 10–20)
CALCIUM BLD-MCNC: 8.8 MG/DL (ref 8.5–10.1)
CHLORIDE SERPL-SCNC: 106 MMOL/L (ref 98–112)
CO2 SERPL-SCNC: 29 MMOL/L (ref 21–32)
COLOR UR: YELLOW
CREAT BLD-MCNC: 0.83 MG/DL
DEPRECATED RDW RBC AUTO: 42.4 FL (ref 35.1–46.3)
EOSINOPHIL # BLD AUTO: 0.27 X10(3) UL (ref 0–0.7)
EOSINOPHIL NFR BLD AUTO: 3.6 %
ERYTHROCYTE [DISTWIDTH] IN BLOOD BY AUTOMATED COUNT: 12.3 % (ref 11–15)
ERYTHROCYTE [SEDIMENTATION RATE] IN BLOOD: 14 MM/HR
FASTING STATUS PATIENT QL REPORTED: NO
GFR SERPLBLD BASED ON 1.73 SQ M-ARVRAT: 94 ML/MIN/1.73M2 (ref 60–?)
GLOBULIN PLAS-MCNC: 3.9 G/DL (ref 2.8–4.4)
GLUCOSE BLD-MCNC: 96 MG/DL (ref 70–99)
GLUCOSE UR-MCNC: NEGATIVE MG/DL
HCT VFR BLD AUTO: 39.2 %
HGB BLD-MCNC: 13.2 G/DL
HGB UR QL STRIP.AUTO: NEGATIVE
IMM GRANULOCYTES # BLD AUTO: 0.01 X10(3) UL (ref 0–1)
IMM GRANULOCYTES NFR BLD: 0.1 %
KETONES UR-MCNC: NEGATIVE MG/DL
LEUKOCYTE ESTERASE UR QL STRIP.AUTO: NEGATIVE
LYMPHOCYTES # BLD AUTO: 2.61 X10(3) UL (ref 1–4)
LYMPHOCYTES NFR BLD AUTO: 35.2 %
MAGNESIUM SERPL-MCNC: 2.1 MG/DL (ref 1.6–2.6)
MCH RBC QN AUTO: 31.5 PG (ref 26–34)
MCHC RBC AUTO-ENTMCNC: 33.7 G/DL (ref 31–37)
MCV RBC AUTO: 93.6 FL
MONOCYTES # BLD AUTO: 0.6 X10(3) UL (ref 0.1–1)
MONOCYTES NFR BLD AUTO: 8.1 %
NEUTROPHILS # BLD AUTO: 3.88 X10 (3) UL (ref 1.5–7.7)
NEUTROPHILS # BLD AUTO: 3.88 X10(3) UL (ref 1.5–7.7)
NEUTROPHILS NFR BLD AUTO: 52.3 %
NITRITE UR QL STRIP.AUTO: NEGATIVE
OSMOLALITY SERPL CALC.SUM OF ELEC: 297 MOSM/KG (ref 275–295)
PH UR: 5.5 [PH] (ref 5–8)
PLATELET # BLD AUTO: 369 10(3)UL (ref 150–450)
POTASSIUM SERPL-SCNC: 4.1 MMOL/L (ref 3.5–5.1)
PROT SERPL-MCNC: 7.6 G/DL (ref 6.4–8.2)
PROT UR-MCNC: NEGATIVE MG/DL
RBC # BLD AUTO: 4.19 X10(6)UL
SODIUM SERPL-SCNC: 143 MMOL/L (ref 136–145)
SP GR UR STRIP: >=1.03 (ref 1–1.03)
UROBILINOGEN UR STRIP-ACNC: 0.2
WBC # BLD AUTO: 7.4 X10(3) UL (ref 4–11)

## 2023-07-17 PROCEDURE — 36415 COLL VENOUS BLD VENIPUNCTURE: CPT

## 2023-07-17 PROCEDURE — 83735 ASSAY OF MAGNESIUM: CPT

## 2023-07-17 PROCEDURE — 85652 RBC SED RATE AUTOMATED: CPT

## 2023-07-17 PROCEDURE — 3078F DIAST BP <80 MM HG: CPT | Performed by: INTERNAL MEDICINE

## 2023-07-17 PROCEDURE — 3074F SYST BP LT 130 MM HG: CPT | Performed by: INTERNAL MEDICINE

## 2023-07-17 PROCEDURE — 85025 COMPLETE CBC W/AUTO DIFF WBC: CPT

## 2023-07-17 PROCEDURE — 81001 URINALYSIS AUTO W/SCOPE: CPT | Performed by: INTERNAL MEDICINE

## 2023-07-17 PROCEDURE — 81015 MICROSCOPIC EXAM OF URINE: CPT | Performed by: INTERNAL MEDICINE

## 2023-07-17 PROCEDURE — 3008F BODY MASS INDEX DOCD: CPT | Performed by: INTERNAL MEDICINE

## 2023-07-17 PROCEDURE — 99214 OFFICE O/P EST MOD 30 MIN: CPT | Performed by: INTERNAL MEDICINE

## 2023-07-17 PROCEDURE — 87493 C DIFF AMPLIFIED PROBE: CPT

## 2023-07-17 PROCEDURE — 80053 COMPREHEN METABOLIC PANEL: CPT

## 2023-07-17 RX ORDER — ONDANSETRON 4 MG/1
4 TABLET, ORALLY DISINTEGRATING ORAL EVERY 8 HOURS PRN
COMMUNITY

## 2023-07-17 RX ORDER — DICYCLOMINE HCL 20 MG
20 TABLET ORAL
COMMUNITY

## 2023-07-17 NOTE — TELEPHONE ENCOUNTER
Pt called, she found the milk of magnesium citrate     Needs call back with explanation on how to use the stool sample kit she picked up today     [phone]

## 2023-07-17 NOTE — PATIENT INSTRUCTIONS
1. Generalized abdominal pain  I like the idea of treatment here, with Zofran and Imodium if we need this afternoon, then taking the bottle of magnesium citrate sometime later this afternoon, this should induce some bowel movement here, lets collect samples if we can, and go from there, taking another dose of Imodium and Zofran to calm things down once stool samples have been collected  Lets make a call to the gastroenterologist  Lets try to get the KUB x-ray done in the next 1 to [facility] or affiliated location, and I will notify you once I start to see some results  - GASTRO - INTERNAL    2. Loose stools  As above, unknown cause  - CBC WITH DIFFERENTIAL WITH PLATELET; Future  - COMP METABOLIC PANEL (14); Future  - SED RATE, WESTERGREN (AUTOMATED); Future  - XR ABDOMEN (1 VIEW) (CPT=74018); Future  - GASTRO - INTERNAL  - URINALYSIS WITH CULTURE REFLEX  - MAGNESIUM; Future  - Magnesium Citrate Oral Solution; Take 296 mL by mouth once for 1 dose. Dispense: 296 mL; Refill: 0  - CDIFFICILE TOXIGENIC PCR (OPT); Future  - WBC, STOOL; Future  - STOOL CULTURE W/SHIGATOXIN; Future  - GIARDIA + CRYPTO ANTIGEN, STOOL; Future    3. Family history of celiac disease  This could be playing a factor, lets talk to the specialist about it.  - GASTRO - INTERNAL    -I do think it is appropriate to be off work at least until Thursday this week when you are feeling better.

## 2023-07-17 NOTE — TELEPHONE ENCOUNTER
Patient is now calling to request a call back. Patient states Dr Katelyn Ellis informed her she can take [facility] of Magnesium. \" Patient is wondering if that is the same as the Magnesium Citrate prescription.

## 2023-07-17 NOTE — TELEPHONE ENCOUNTER
Patient saw Dr Svetlana Adler in [facility] is calling to inform the office that the medication sent today for Magnesium Citrate Oral Solution is on back order and a new script is needed for a different medication.

## 2023-07-18 ENCOUNTER — LAB ENCOUNTER (OUTPATIENT)
Dept: LAB | Facility: HOSPITAL | Age: 38
End: 2023-07-18
Attending: INTERNAL MEDICINE
Payer: COMMERCIAL

## 2023-07-18 ENCOUNTER — HOSPITAL ENCOUNTER (OUTPATIENT)
Dept: GENERAL RADIOLOGY | Facility: HOSPITAL | Age: 38
Discharge: HOME | End: 2023-07-18
Attending: INTERNAL MEDICINE
Payer: COMMERCIAL

## 2023-07-18 DIAGNOSIS — R19.5 LOOSE STOOLS: ICD-10-CM

## 2023-07-18 LAB
C DIFF TOX B STL QL: NEGATIVE
CRYPTOSP AG STL QL IA: NEGATIVE
G LAMBLIA AG STL QL IA: NEGATIVE

## 2023-07-18 PROCEDURE — 87427 SHIGA-LIKE TOXIN AG IA: CPT

## 2023-07-18 PROCEDURE — 89055 LEUKOCYTE ASSESSMENT FECAL: CPT

## 2023-07-18 PROCEDURE — 87329 GIARDIA AG IA: CPT

## 2023-07-18 PROCEDURE — 87272 CRYPTOSPORIDIUM AG IF: CPT

## 2023-07-18 PROCEDURE — 87046 STOOL CULTR AEROBIC BACT EA: CPT

## 2023-07-18 PROCEDURE — 87045 FECES CULTURE AEROBIC BACT: CPT

## 2023-07-18 PROCEDURE — 74018 RADEX ABDOMEN 1 VIEW: CPT | Performed by: INTERNAL MEDICINE

## 2023-07-19 ENCOUNTER — TELEPHONE (OUTPATIENT)
Dept: INTERNAL MEDICINE CLINIC | Facility: CLINIC | Age: 38
End: 2023-07-19

## 2023-07-19 NOTE — TELEPHONE ENCOUNTER
I'm Not in the office until tomorrow sometime, she should follow the plan we discussed, and in my instructions it is okay to see any of the available GI doctors in that office, whoever can see her first even the nurse practitioners, I would encourage her to call back and take the first available practitioner

## 2023-07-19 NOTE — TELEPHONE ENCOUNTER
Patient is calling and would really like to talk directly to   Dr Mak Marie.  Patient last saw the doctor on 7/17/2023. Patient states she did everything the doctor asked her to do and she is still very bloated,      Patient states she is still in pain and is still very bloated. Patient is asking if there is something that can be prescribed to minimize her symptoms. Please advise    Patient also wanted the doctor to know that she called the   GI doctor (Dr Balta Pfeiffer) and the earliest she could get in is October. Is this ok or does Dr Mak Marie have another recommendation.     Please call and advise

## 2023-07-19 NOTE — TELEPHONE ENCOUNTER
Please advise - to DR. FORDE -     Called patient and advised if pain gets worse to go to ER - verbalized understanding

## 2023-07-20 ENCOUNTER — TELEPHONE (OUTPATIENT)
Dept: INTERNAL MEDICINE CLINIC | Facility: CLINIC | Age: 38
End: 2023-07-20

## 2023-07-20 NOTE — TELEPHONE ENCOUNTER
To Dr FORDE -- please advise     See TE 7/19. Pt has appt with GI in 2 weeks which is the earliest appt she can get into. She is asking what she can do in the meantime to help with bloating? States the magnesium citrate worked for 3 days and she was able to move her bowels. Pt states she cannot go to work while taking magnesium citrate as it makes her have to run to the bathroom. Pt is a teacher. ER precautions reviewed with pt.

## 2023-07-20 NOTE — TELEPHONE ENCOUNTER
Trying some Gas-X, or simethicone 80 mg chewable tablets 2 of them 3 times a day, and taking the probiotics would probably be the only thing at this time I recommend. Getting in with the specialist and going from there. Dietary adjustments as discussed at her visit, I would recommend against any further use of the magnesium citrate, this is only supposed to be used as a one-time event.   Nursing communicate

## 2023-07-20 NOTE — TELEPHONE ENCOUNTER
Has appointment in 2 weeks with GI. However still very bloated and uncomfortable. Is there anything else patient can do to help in the next 2 weeks?     Please call and advise  #[phone]

## 2023-07-21 NOTE — TELEPHONE ENCOUNTER
Called patient relaying Ryan Mathew MD recommendations message below. Patient is taking Probiotic and will start Gas X today. Gave suggestion of no bubble drinks and stop using a straw as it pulls gas into belly.   Suggested to up date Danley Mage' Amico on symptoms,

## 2023-07-25 ENCOUNTER — TELEPHONE (OUTPATIENT)
Dept: INTERNAL MEDICINE CLINIC | Facility: CLINIC | Age: 38
End: 2023-07-25

## 2023-07-25 DIAGNOSIS — F41.9 ANXIETY: ICD-10-CM

## 2023-07-25 RX ORDER — ESCITALOPRAM OXALATE 20 MG/1
20 TABLET ORAL DAILY
Qty: 90 TABLET | Refills: 1 | Status: SHIPPED | OUTPATIENT
Start: 2023-07-25

## 2023-07-25 NOTE — TELEPHONE ENCOUNTER
TO Dr. Claudia Herreras to please advise on refill and for periodic medication review. Patient has only been seen for acute visits in the last year, but did have blood work done recently.

## 2023-07-25 NOTE — TELEPHONE ENCOUNTER
Pt is out of escitalopram 20 mg  Took last pill yesterday  Please send refill today to [facility]     Pt can be reached at [phone]

## 2023-08-04 ENCOUNTER — LAB ENCOUNTER (OUTPATIENT)
Dept: LAB | Facility: HOSPITAL | Age: 38
End: 2023-08-04
Attending: INTERNAL MEDICINE
Payer: COMMERCIAL

## 2023-08-04 ENCOUNTER — TELEPHONE (OUTPATIENT)
Dept: INTERNAL MEDICINE CLINIC | Facility: CLINIC | Age: 38
End: 2023-08-04

## 2023-08-04 DIAGNOSIS — Z11.1 SCREENING FOR TUBERCULOSIS: ICD-10-CM

## 2023-08-04 DIAGNOSIS — Z11.1 SCREENING FOR TUBERCULOSIS: Primary | ICD-10-CM

## 2023-08-04 PROCEDURE — 86480 TB TEST CELL IMMUN MEASURE: CPT

## 2023-08-04 PROCEDURE — 36415 COLL VENOUS BLD VENIPUNCTURE: CPT

## 2023-08-04 NOTE — TELEPHONE ENCOUNTER
Called patient and relayed MD's message. Patient stated she needs everything completed by Wednesday. Will not be enough time for TB skin test. Ordered TB blood test instead. Patient will go to lab today. Patient will drop off paperwork Monday. Clinical to fax paperwork to patient's employer by Wednesday.

## 2023-08-04 NOTE — TELEPHONE ENCOUNTER
Reviewed chart  Pt recently saw Dr FORDE in office on 7/17/23 for abdominal pain  She also had labs done      I attempted to reach patient, Left message to call back. Does patient need a form completed? What does her work require?

## 2023-08-04 NOTE — TELEPHONE ENCOUNTER
Patient is calling to schedule an annual physical with Dr Queenie Valdez. Patient was instructed that there are no openings until 9/7/2023. Patient states she needs the physical completed by next Wednesday, 8/9/2023. Patient has an appointment with another physician on 8/8/2023 at 0900 at [facility] is hoping to see Dr Queenie Valdez the same day because she is already off work. At this time, patient scheduled an appointment for her annual physical on 9/7/2023 at 4pm. Patient is going to talk to her supervisor and see if that would be okay. In the meantime, patient is requesting to be seen earlier.       # [phone]

## 2023-08-04 NOTE — TELEPHONE ENCOUNTER
Patient is calling back, she does have a form that needs to be completed and she needs a TB test as well. Patient states she can drop off the form,  instructed patient to bring form with her to her next appointment.

## 2023-08-04 NOTE — TELEPHONE ENCOUNTER
To Dr FORDE-----See messages below  Pt needs physical form completed for work and TB test, per documentation below  Do you need to see patient in office again?    Pt needs physical by next Wednesday  Wanting to see you on 8/8  She saw you on 7/17 for abdominal pain and had labs done at that time

## 2023-08-04 NOTE — TELEPHONE ENCOUNTER
She does not need to come in I can always addend her last office note, but she does need the TB test, nursing see if he can arrange the TB testing and recheck within the timeframe she needs.   Have her bring the paperwork in with her when she gets the TB test

## 2023-08-07 LAB
M TB IFN-G CD4+ T-CELLS BLD-ACNC: 0.02 IU/ML
M TB TUBERC IFN-G BLD QL: NEGATIVE
M TB TUBERC IGNF/MITOGEN IGNF CONTROL: >10 IU/ML
QFT TB1 AG MINUS NIL: 0.01 IU/ML
QFT TB2 AG MINUS NIL: 0 IU/ML

## 2023-08-07 NOTE — TELEPHONE ENCOUNTER
Spoke with patient informed her that completed form has been faxed to number she provided. Patient states she does not need a copy of the form. Copy to scan and weekly binder, confirmation received.      Fax # [phone]

## 2023-09-13 ENCOUNTER — HOSPITAL ENCOUNTER (OUTPATIENT)
Age: 38
Discharge: HOME | End: 2023-09-13
Payer: COMMERCIAL

## 2023-09-13 VITALS
HEIGHT: 59 IN | HEART RATE: 80 BPM | RESPIRATION RATE: 22 BRPM | SYSTOLIC BLOOD PRESSURE: 110 MMHG | OXYGEN SATURATION: 93 % | WEIGHT: 153 LBS | DIASTOLIC BLOOD PRESSURE: 56 MMHG | TEMPERATURE: 98 F | BODY MASS INDEX: 30.84 KG/M2

## 2023-09-13 DIAGNOSIS — B34.9 VIRAL SYNDROME: Primary | ICD-10-CM

## 2023-09-13 LAB
B-HCG UR QL: NEGATIVE
BILIRUB UR QL STRIP: NEGATIVE
CLARITY UR: CLEAR
COLOR UR: YELLOW
GLUCOSE UR STRIP-MCNC: NEGATIVE MG/DL
KETONES UR STRIP-MCNC: NEGATIVE MG/DL
LEUKOCYTE ESTERASE UR QL STRIP: NEGATIVE
NITRITE UR QL STRIP: NEGATIVE
PH UR STRIP: 5.5 [PH]
PROT UR STRIP-MCNC: NEGATIVE MG/DL
SARS-COV-2 RNA RESP QL NAA+PROBE: NOT DETECTED
SP GR UR STRIP: >=1.03
UROBILINOGEN UR STRIP-ACNC: <2 MG/DL

## 2023-09-13 PROCEDURE — 99214 OFFICE O/P EST MOD 30 MIN: CPT

## 2023-09-13 PROCEDURE — 81025 URINE PREGNANCY TEST: CPT

## 2023-09-13 PROCEDURE — 81002 URINALYSIS NONAUTO W/O SCOPE: CPT

## 2023-09-13 PROCEDURE — 99212 OFFICE O/P EST SF 10 MIN: CPT

## 2023-09-13 NOTE — DISCHARGE INSTRUCTIONS
Continue Tylenol and Motrin alternating. Recheck your COVID test tomorrow. Increase fluids. If any worsening symptoms go to the emergency room.

## 2023-09-13 NOTE — ED INITIAL ASSESSMENT (HPI)
Bodyaches/ chills/ started yesterday denies fever. Took ibuprofen 3 tabs  every 6 hrs  but no relief last night. pt states she does not feel good

## 2023-09-20 ENCOUNTER — TELEPHONE (OUTPATIENT)
Dept: INTERNAL MEDICINE CLINIC | Facility: CLINIC | Age: 38
End: 2023-09-20

## 2023-09-20 RX ORDER — FLUTICASONE PROPIONATE 50 MCG
2 SPRAY, SUSPENSION (ML) NASAL DAILY
Qty: 9.9 ML | Refills: 0 | Status: SHIPPED | OUTPATIENT
Start: 2023-09-20

## 2023-09-20 NOTE — TELEPHONE ENCOUNTER
Patient should be very consistent with Flonase nasal spray 2 sprays each nostril daily--this needs to be done consistently as it will not work on a as needed basis.   I would also recommend that patient try over-the-counter nonsedating antihistamine such as Zyrtec-D--1 tablet daily as needed to help with postnasal/sinus drip and congestion

## 2023-09-20 NOTE — TELEPHONE ENCOUNTER
Spoke to pt and relayed MD message and instructions. Pt verbalized understanding and agrees with plan. Requesting rx for flonase nasal spray as she states it is expensive OTC. Rx pended. Also inquiring if the zyrtec D and flonase will help with the cough or if she should be taking a cough suppressant as well. If so, what would be recommended?      Dr. Dawn Mcguire, please advise

## 2023-09-20 NOTE — TELEPHONE ENCOUNTER
Respiratory infection triage:    Fever:  [x]  No fever  []  Fever>100.4    Cough:  [] Tight cough  [] Cough with exertion  [x] Dry cough  [] Sputum production, Color:     Breathing:  [] Mild shortness of breath interfering with activity  [] Wheezing  [] Pain with deep breathing  [] Using inhaler    Other symptoms:  [] Sore throat  [] Difficulty swallowing  [x] Nasal drainage/congestion:runny  [] Sinus congestion/pressure  [] Ear pain  [] Body aches  [] Poor appetite  [] Loss of sense of smell   [] Loss of sense of taste  []Conjunctivitis? [] Any recent travel? [] Any sick contacts? [] Are you a healthcare worker? ADDITIONAL NOTES:  Please advise - called patient with [facility] since Monday  , she took covid test yesterday and today - negative - will be using [facility] in [place]  to DR. BARTH

## 2023-09-20 NOTE — TELEPHONE ENCOUNTER
Please call patient  She has had a tickle in her throat, possibly post nasal  Nose full when she wakes up  Patient has been using Flonase and allergy medication, cough continues, no sore throat  Cough medication only lasts for a bit  Is there a different over the counter that could be recommended? Or can medication be prescribed?   Please call to discuss/advise  Tasked to nursing

## 2023-10-13 ENCOUNTER — TELEPHONE (OUTPATIENT)
Dept: INTERNAL MEDICINE CLINIC | Facility: CLINIC | Age: 38
End: 2023-10-13

## 2023-10-13 RX ORDER — NITROFURANTOIN 25; 75 MG/1; MG/1
100 CAPSULE ORAL 2 TIMES DAILY
Qty: 10 CAPSULE | Refills: 0 | Status: SHIPPED | OUTPATIENT
Start: 2023-10-13

## 2023-10-13 NOTE — TELEPHONE ENCOUNTER
Spoke with pt. C/O UTI symptoms  Sent off prescription for macrobid x 5 days. Pt understands and agrees to telephone charge. 5 mins spent with encounter.

## 2023-11-02 ENCOUNTER — OFFICE VISIT (OUTPATIENT)
Dept: FAMILY MEDICINE CLINIC | Facility: CLINIC | Age: 38
End: 2023-11-02
Payer: COMMERCIAL

## 2023-11-02 VITALS
BODY MASS INDEX: 28.22 KG/M2 | HEART RATE: 82 BPM | SYSTOLIC BLOOD PRESSURE: 109 MMHG | OXYGEN SATURATION: 98 % | HEIGHT: 59 IN | TEMPERATURE: 97 F | DIASTOLIC BLOOD PRESSURE: 70 MMHG | WEIGHT: 140 LBS

## 2023-11-02 DIAGNOSIS — J04.0 LARYNGITIS: Primary | ICD-10-CM

## 2023-11-02 PROCEDURE — 99213 OFFICE O/P EST LOW 20 MIN: CPT | Performed by: NURSE PRACTITIONER

## 2023-11-02 PROCEDURE — 3008F BODY MASS INDEX DOCD: CPT | Performed by: NURSE PRACTITIONER

## 2023-11-02 PROCEDURE — 3074F SYST BP LT 130 MM HG: CPT | Performed by: NURSE PRACTITIONER

## 2023-11-02 PROCEDURE — 3078F DIAST BP <80 MM HG: CPT | Performed by: NURSE PRACTITIONER

## 2023-11-03 ENCOUNTER — OFFICE VISIT (OUTPATIENT)
Dept: FAMILY MEDICINE CLINIC | Facility: CLINIC | Age: 38
End: 2023-11-03
Payer: COMMERCIAL

## 2023-11-03 VITALS
OXYGEN SATURATION: 97 % | RESPIRATION RATE: 18 BRPM | BODY MASS INDEX: 28.22 KG/M2 | WEIGHT: 140 LBS | HEART RATE: 96 BPM | SYSTOLIC BLOOD PRESSURE: 125 MMHG | DIASTOLIC BLOOD PRESSURE: 75 MMHG | TEMPERATURE: 98 F | HEIGHT: 59 IN

## 2023-11-03 DIAGNOSIS — H69.93 EUSTACHIAN TUBE DYSFUNCTION, BILATERAL: ICD-10-CM

## 2023-11-03 DIAGNOSIS — H65.192 OTHER NON-RECURRENT ACUTE NONSUPPURATIVE OTITIS MEDIA OF LEFT EAR: Primary | ICD-10-CM

## 2023-11-03 PROCEDURE — 99213 OFFICE O/P EST LOW 20 MIN: CPT | Performed by: NURSE PRACTITIONER

## 2023-11-03 PROCEDURE — 3078F DIAST BP <80 MM HG: CPT | Performed by: NURSE PRACTITIONER

## 2023-11-03 PROCEDURE — 3008F BODY MASS INDEX DOCD: CPT | Performed by: NURSE PRACTITIONER

## 2023-11-03 PROCEDURE — 3074F SYST BP LT 130 MM HG: CPT | Performed by: NURSE PRACTITIONER

## 2023-11-03 RX ORDER — CETIRIZINE HYDROCHLORIDE, PSEUDOEPHEDRINE HYDROCHLORIDE 5; 120 MG/1; MG/1
1 TABLET, FILM COATED, EXTENDED RELEASE ORAL 2 TIMES DAILY
Qty: 30 TABLET | Refills: 0 | Status: SHIPPED | OUTPATIENT
Start: 2023-11-03

## 2023-11-03 RX ORDER — AMOXICILLIN AND CLAVULANATE POTASSIUM 875; 125 MG/1; MG/1
1 TABLET, FILM COATED ORAL 2 TIMES DAILY
Qty: 14 TABLET | Refills: 0 | Status: SHIPPED | OUTPATIENT
Start: 2023-11-03 | End: 2023-11-10

## 2023-11-03 RX ORDER — OXYMETAZOLINE HYDROCHLORIDE 0.05 G/100ML
1 SPRAY NASAL 2 TIMES DAILY
Qty: 1 EACH | Refills: 0 | Status: SHIPPED | OUTPATIENT
Start: 2023-11-03 | End: 2023-11-05

## 2023-11-22 ENCOUNTER — TELEPHONE (OUTPATIENT)
Dept: INTERNAL MEDICINE CLINIC | Facility: CLINIC | Age: 38
End: 2023-11-22

## 2023-11-22 RX ORDER — AMOXICILLIN AND CLAVULANATE POTASSIUM 875; 125 MG/1; MG/1
1 TABLET, FILM COATED ORAL 2 TIMES DAILY
Qty: 20 TABLET | Refills: 1 | Status: SHIPPED | OUTPATIENT
Start: 2023-11-22 | End: 2023-12-12

## 2023-11-23 NOTE — TELEPHONE ENCOUNTER
Telephone call to patient and situation discussed. Patient feels that she has an ear infection. She was seen in urgent care about 2 weeks ago and was treated for an ear infection but felt that it never totally went away. Patient feels her ears are popping. She feels well otherwise. She is previously had a similar episode when then but then developed severe ear pain. Patient has some pain in her other ear at this time. Patient is to push fluids. She may use Tylenol as necessary I will send a prescription for Augmentin 875 mg orally twice a day for 10 days with 1 refill to her pharmacy. If patient has more problems she is to go to ER or urgent care or the emergency room. She will call back if she has more problems.

## 2023-12-15 ENCOUNTER — VIRTUAL PHONE E/M (OUTPATIENT)
Dept: INTERNAL MEDICINE CLINIC | Facility: CLINIC | Age: 38
End: 2023-12-15

## 2023-12-15 ENCOUNTER — TELEPHONE (OUTPATIENT)
Dept: INTERNAL MEDICINE CLINIC | Facility: CLINIC | Age: 38
End: 2023-12-15

## 2023-12-15 DIAGNOSIS — R09.81 NASAL CONGESTION: ICD-10-CM

## 2023-12-15 DIAGNOSIS — J32.0 CHRONIC MAXILLARY SINUSITIS: Primary | ICD-10-CM

## 2023-12-15 PROCEDURE — 99443 PHONE E/M BY PHYS 21-30 MIN: CPT | Performed by: INTERNAL MEDICINE

## 2023-12-15 RX ORDER — AZELASTINE 1 MG/ML
2 SPRAY, METERED NASAL 2 TIMES DAILY PRN
Qty: 30 ML | Refills: 1 | Status: SHIPPED | OUTPATIENT
Start: 2023-12-15

## 2023-12-15 RX ORDER — CEFADROXIL 500 MG/1
500 CAPSULE ORAL 2 TIMES DAILY
Qty: 30 CAPSULE | Refills: 1 | Status: SHIPPED | OUTPATIENT
Start: 2023-12-15

## 2023-12-15 NOTE — TELEPHONE ENCOUNTER
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 Reports pain starts at the base of the neck extending to top of head. Having difficulty  turning head from side to side. Dx with sinus and ear infection about 1 mos ago, symptoms has returned with the following. Tested negative for Covid this morning  and yesterday. URI Triage:        Fever: Yes[]  No[x]  Unknown[]  [] Temperature:   [] Chills  [] Night sweats  [] Body aches    Cough: Yes[]  No[x]  [] Productive cough  [] Cough with exertion  [] Dry cough    Respiratory Symptoms: Yes[]  No[x]  [] Wheezing  [] Pain with deep breathing  [] SOB with exertion  [] SOB at rest  [] Heavy breathing  [] Chest discomfort with deep breathing or coughing    GI Symptoms: Yes [x] No[]  [] Diarrhea  [x] Nausea  [] Vomiting  [] Abdominal pain  [] Lack of appetite    Other symptoms:  [] Sore throat  [x] Sinus pressure  [x] Nasal drainage green  [x] Nasal congestion  [] Chest congestion  [x] Head congestion  [x] PND  [] Facial pain   [x] Earache   [] Conjunctivitis  [] Headache  [x] Fatigue  [] Weakness  [] Loss of sense of smell   [] Loss of sense of taste    []OTC Medications:   Ibuprofen, Claritin    [] Any recent travel  [] Any sick contacts    [] Positive Covid exposure (<6 feet, >15 min, no mask worn)  If yes, date of exposure (last contact):     [] Covid Test  Date/Result:      Vaccinated (covid): Yes  []   No []  Booster:  Yes  []  No []    Symptom onset:        Patient was notified that this message will be routed to the physician to determine what their recommendation (s) would be. In the meantime, if they develop new or worsening symptoms, they were advised to call back or seek emergent evaluation at the ER. ER precautions reviewed. Reviewed business hours and the after-hour service for the on-call physician, I.e, concerns/questions.

## 2023-12-15 NOTE — TELEPHONE ENCOUNTER
Just got over an ear infection, Neck hurting for a couple weeks . Patient can hardly move neck. She  also has had some nausea. Congestion started Monday. Very tired.  Please call and advise    [phone]

## 2023-12-15 NOTE — PROGRESS NOTES
Virtual Visit/Telephone Note    Cheri Jay Lisa verbally consents to a Virtual/Telephone Check-In service on 12/15/23  Patient understands and accepts financial responsibility for any deductible, co-insurance and/or co-pays associated with this service. Duration/time spent of the service: 20 Minutes of direct patient contact. 10 Minutes of chart review, documentation, medical decision making. 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 Chronic maxillary sinusitis  I like the idea if you think the decongestants from an allergy standpoint do not work with Radha Cortes, to graduate up to using the antibiotic sometime next week, if you do decide to go on them it would be for chronic treatment you should stay on them and think about seeing the ENT specialist  - cefadroxil 500 MG Oral Cap; Take 1 capsule (500 mg total) by mouth 2 (two) times daily. Dispense: 30 capsule; Refill: 1    2.  Nasal congestion  Use Flonase spray and Astelin, 1 after the other, 2 sprays each based on the packaging, over-the-counter allergy medicine that is nonsedating or not stimulatory is also helpful in this case, hopefully if the weather does frost over, your allergies will come  - azelastine 0.1 % Nasal Solution; 2 sprays by Nasal route 2 (two) times daily as needed for Rhinitis. Dispense: 30 mL; Refill: 1    3.neck and back pain  For the neck and back pain lets schedule some Aleve for at least 1 to 2 weeks, 2 tabs of Aleve twice daily with food, trying to get used to your environment at work if it is aggravating you, looking at your other surroundings as well to help with pain or a postural issue. Nikolai Jimenez DO  12/15/2023  9:40 AM    Spent 30 minutes obtaining history, evaluating patient, discussing treatment options, diet, exercise, review of available labs and radiology reports, and completing documentation.

## 2023-12-22 ENCOUNTER — TELEPHONE (OUTPATIENT)
Dept: INTERNAL MEDICINE CLINIC | Facility: CLINIC | Age: 38
End: 2023-12-22

## 2023-12-22 DIAGNOSIS — F41.9 ANXIETY: ICD-10-CM

## 2023-12-22 RX ORDER — ESCITALOPRAM OXALATE 20 MG/1
20 TABLET ORAL DAILY
Qty: 90 TABLET | Refills: 0 | Status: SHIPPED | OUTPATIENT
Start: 2023-12-22

## 2023-12-22 NOTE — TELEPHONE ENCOUNTER
Patient called for Lexapro refill. The following prescription was reviewed, authorized, and electronically sent to the pharmacy. Requested Prescriptions     Signed Prescriptions Disp Refills    escitalopram 20 MG Oral Tab 90 tablet 0     Sig: Take 1 tablet (20 mg total) by mouth daily.      Authorizing Provider: Melony Yu

## 2024-01-08 ENCOUNTER — OFFICE VISIT (OUTPATIENT)
Dept: INTERNAL MEDICINE CLINIC | Facility: CLINIC | Age: 39
End: 2024-01-08

## 2024-01-08 VITALS
HEIGHT: 59 IN | SYSTOLIC BLOOD PRESSURE: 108 MMHG | BODY MASS INDEX: 31.45 KG/M2 | DIASTOLIC BLOOD PRESSURE: 72 MMHG | OXYGEN SATURATION: 99 % | WEIGHT: 156 LBS | TEMPERATURE: 98 F | HEART RATE: 74 BPM

## 2024-01-08 DIAGNOSIS — E28.2 PCOS (POLYCYSTIC OVARIAN SYNDROME): ICD-10-CM

## 2024-01-08 DIAGNOSIS — R10.33 PERIUMBILICAL ABDOMINAL PAIN: Primary | ICD-10-CM

## 2024-01-08 PROCEDURE — 99214 OFFICE O/P EST MOD 30 MIN: CPT | Performed by: INTERNAL MEDICINE

## 2024-01-08 PROCEDURE — 3074F SYST BP LT 130 MM HG: CPT | Performed by: INTERNAL MEDICINE

## 2024-01-08 PROCEDURE — 3008F BODY MASS INDEX DOCD: CPT | Performed by: INTERNAL MEDICINE

## 2024-01-08 PROCEDURE — 3078F DIAST BP <80 MM HG: CPT | Performed by: INTERNAL MEDICINE

## 2024-01-08 NOTE — PATIENT INSTRUCTIONS
1. Periumbilical abdominal pain  I like the idea of you coming this with anti-inflammatories, Aleve 2 tablets twice a day for the next 3 to 5 days  Get some lab work done  Get the urine tested as well  And be on the look out for symptoms, trying to eat a low fiber/residue diet, and only clear liquids if your stomach is bothering you  If the area seems to declare itself and gets worse, you would have no choice but to go to the emergency room and get a CAT scan/evaluation there, feel free to do that if something is getting worse  - CBC With Differential With Platelet; Future  - Comp Metabolic Panel (14); Future  - Magnesium [E]; Future  - Urinalysis, Routine; Future  - Urine Culture, Routine; Future    2. PCOS (polycystic ovarian syndrome)  Unknown whether the suppressive birth control was doing more than we think to help with the abdomen/ovaries.

## 2024-01-09 ENCOUNTER — LAB ENCOUNTER (OUTPATIENT)
Dept: LAB | Facility: HOSPITAL | Age: 39
End: 2024-01-09
Attending: INTERNAL MEDICINE
Payer: COMMERCIAL

## 2024-01-09 DIAGNOSIS — R10.33 PERIUMBILICAL ABDOMINAL PAIN: ICD-10-CM

## 2024-01-09 DIAGNOSIS — F41.9 ANXIETY: ICD-10-CM

## 2024-01-09 LAB
ALBUMIN SERPL-MCNC: 4.2 G/DL (ref 3.4–5)
ALBUMIN/GLOB SERPL: 1.3 {RATIO} (ref 1–2)
ALP LIVER SERPL-CCNC: 84 U/L
ALT SERPL-CCNC: 33 U/L
ANION GAP SERPL CALC-SCNC: 1 MMOL/L (ref 0–18)
AST SERPL-CCNC: 19 U/L (ref 15–37)
BASOPHILS # BLD AUTO: 0.07 X10(3) UL (ref 0–0.2)
BASOPHILS NFR BLD AUTO: 0.7 %
BILIRUB SERPL-MCNC: 0.2 MG/DL (ref 0.1–2)
BILIRUB UR QL STRIP.AUTO: NEGATIVE
BUN BLD-MCNC: 16 MG/DL (ref 9–23)
CALCIUM BLD-MCNC: 8.9 MG/DL (ref 8.5–10.1)
CHLORIDE SERPL-SCNC: 106 MMOL/L (ref 98–112)
CLARITY UR REFRACT.AUTO: CLEAR
CO2 SERPL-SCNC: 29 MMOL/L (ref 21–32)
CREAT BLD-MCNC: 0.72 MG/DL
EGFRCR SERPLBLD CKD-EPI 2021: 111 ML/MIN/1.73M2 (ref 60–?)
EOSINOPHIL # BLD AUTO: 0.45 X10(3) UL (ref 0–0.7)
EOSINOPHIL NFR BLD AUTO: 4.5 %
ERYTHROCYTE [DISTWIDTH] IN BLOOD BY AUTOMATED COUNT: 11.7 %
FASTING STATUS PATIENT QL REPORTED: NO
GLOBULIN PLAS-MCNC: 3.3 G/DL (ref 2.8–4.4)
GLUCOSE BLD-MCNC: 94 MG/DL (ref 70–99)
GLUCOSE UR STRIP.AUTO-MCNC: NORMAL MG/DL
HCT VFR BLD AUTO: 38.2 %
HGB BLD-MCNC: 12.8 G/DL
IMM GRANULOCYTES # BLD AUTO: 0.02 X10(3) UL (ref 0–1)
IMM GRANULOCYTES NFR BLD: 0.2 %
KETONES UR STRIP.AUTO-MCNC: NEGATIVE MG/DL
LEUKOCYTE ESTERASE UR QL STRIP.AUTO: NEGATIVE
LYMPHOCYTES # BLD AUTO: 4.27 X10(3) UL (ref 1–4)
LYMPHOCYTES NFR BLD AUTO: 42.8 %
MAGNESIUM SERPL-MCNC: 1.9 MG/DL (ref 1.6–2.6)
MCH RBC QN AUTO: 31.5 PG (ref 26–34)
MCHC RBC AUTO-ENTMCNC: 33.5 G/DL (ref 31–37)
MCV RBC AUTO: 94.1 FL
MONOCYTES # BLD AUTO: 0.73 X10(3) UL (ref 0.1–1)
MONOCYTES NFR BLD AUTO: 7.3 %
NEUTROPHILS # BLD AUTO: 4.43 X10 (3) UL (ref 1.5–7.7)
NEUTROPHILS # BLD AUTO: 4.43 X10(3) UL (ref 1.5–7.7)
NEUTROPHILS NFR BLD AUTO: 44.5 %
NITRITE UR QL STRIP.AUTO: NEGATIVE
OSMOLALITY SERPL CALC.SUM OF ELEC: 283 MOSM/KG (ref 275–295)
PH UR STRIP.AUTO: 5.5 [PH] (ref 5–8)
PLATELET # BLD AUTO: 316 10(3)UL (ref 150–450)
POTASSIUM SERPL-SCNC: 4 MMOL/L (ref 3.5–5.1)
PROT SERPL-MCNC: 7.5 G/DL (ref 6.4–8.2)
PROT UR STRIP.AUTO-MCNC: NEGATIVE MG/DL
RBC # BLD AUTO: 4.06 X10(6)UL
RBC UR QL AUTO: NEGATIVE
SODIUM SERPL-SCNC: 136 MMOL/L (ref 136–145)
SP GR UR STRIP.AUTO: 1.02 (ref 1–1.03)
UROBILINOGEN UR STRIP.AUTO-MCNC: NORMAL MG/DL
WBC # BLD AUTO: 10 X10(3) UL (ref 4–11)

## 2024-01-09 PROCEDURE — 87077 CULTURE AEROBIC IDENTIFY: CPT

## 2024-01-09 PROCEDURE — 85025 COMPLETE CBC W/AUTO DIFF WBC: CPT

## 2024-01-09 PROCEDURE — 83735 ASSAY OF MAGNESIUM: CPT

## 2024-01-09 PROCEDURE — 81003 URINALYSIS AUTO W/O SCOPE: CPT

## 2024-01-09 PROCEDURE — 36415 COLL VENOUS BLD VENIPUNCTURE: CPT

## 2024-01-09 PROCEDURE — 80053 COMPREHEN METABOLIC PANEL: CPT

## 2024-01-09 PROCEDURE — 87086 URINE CULTURE/COLONY COUNT: CPT

## 2024-01-09 NOTE — PROGRESS NOTES
HPI:   Marnie Gonzalez is a 38 year old female who presents for complains of:   Chief Complaint   Patient presents with    Abdominal Pain     Abdominal/pelvic pain since Thursday, very bad nausea, \"pulling\" feeling, slept all day Saturday. Some dizziness, blurry vision, very fatigued for last 2 nights. Used ibuprofen, did not help. Happening in waves.  Has been nauseas and fatigued. Metalic taste in the mouth as well.     Of note, patient stopped her oral birth control over the past month, as directed by her gynecological physicians.  Last menstrual period was approximately 2 weeks ago.    EXAM:   /72   Pulse 74   Temp 98 °F (36.7 °C)   Ht 4' 11\" (1.499 m)   Wt 156 lb (70.8 kg)   LMP 10/26/2023 (Approximate)   SpO2 99%   BMI 31.51 kg/m²   Body mass index is 31.51 kg/m².   Gen. exam: Alert and oriented, in no acute distress   HEENT: Pupils equal and reactive to light and accommodation, moist mucous membranes  Neck exam:  Supple.  Normal thyroid trachea midline, no JVD  Heart exam: Regular rate and rhythm no murmurs no S3 no S4   Lung exam: No rales no rhonchi no wheezes  Abdominal exam: Soft, no masses, nondistended, positive bowel sounds are normoactive, right lower quadrant pain to deep palpation, no ana peritoneal signs, no rebound or guarding  Extremities exam: no clubbing, no cyanosis, no edema  Skin exam: No obvious wounds, no rashes  Neurological exam: Cranial nerves II through XII intact, no gross deficits  Musculoskeletal exam: no arthritis appreciated, no obvious deformity    ASSESSMENT AND PLAN:   Marnie Gonzalez is a 38 year old female who presents with the followin. Periumbilical abdominal pain  I like the idea of you coming this with anti-inflammatories, Aleve 2 tablets twice a day for the next 3 to 5 days  Get some lab work done  Get the urine tested as well  And be on the look out for symptoms, trying to eat a low fiber/residue diet, and only clear liquids if your  stomach is bothering you  If the area seems to declare itself and gets worse, you would have no choice but to go to the emergency room and get a CAT scan/evaluation there, feel free to do that if something is getting worse  - CBC With Differential With Platelet; Future  - Comp Metabolic Panel (14); Future  - Magnesium [E]; Future  - Urinalysis, Routine; Future  - Urine Culture, Routine; Future    2. PCOS (polycystic ovarian syndrome)  Unknown whether the suppressive birth control was doing more than we think to help with the abdomen/ovaries.      Spent 30 minutes obtaining history, evaluating patient, discussing treatment options, diet, exercise, review of available labs and radiology reports, and completing documentation.    Jeff Anthony D'Amico, DO  1/9/2024  4:34 PM

## 2024-01-10 RX ORDER — ESCITALOPRAM OXALATE 20 MG/1
20 TABLET ORAL DAILY
Qty: 90 TABLET | Refills: 3 | Status: SHIPPED | OUTPATIENT
Start: 2024-01-10

## 2024-01-10 NOTE — TELEPHONE ENCOUNTER
Refill request is for a maintenance medication and has met the criteria specified in the Ambulatory Medication Refill Standing Order for eligibility, visits, laboratory, alerts and was sent to the requested pharmacy.    Requested Prescriptions     Signed Prescriptions Disp Refills    escitalopram 20 MG Oral Tab 90 tablet 3     Sig: Take 1 tablet (20 mg total) by mouth daily.     Authorizing Provider: D'AMICO, JEFF ANTHONY     Ordering User: RACHAEL COLLINS

## 2024-01-16 ENCOUNTER — OFFICE VISIT (OUTPATIENT)
Dept: FAMILY MEDICINE CLINIC | Facility: CLINIC | Age: 39
End: 2024-01-16
Payer: COMMERCIAL

## 2024-01-16 VITALS
OXYGEN SATURATION: 97 % | SYSTOLIC BLOOD PRESSURE: 123 MMHG | DIASTOLIC BLOOD PRESSURE: 82 MMHG | HEIGHT: 59 IN | BODY MASS INDEX: 32.05 KG/M2 | TEMPERATURE: 98 F | RESPIRATION RATE: 16 BRPM | HEART RATE: 83 BPM | WEIGHT: 159 LBS

## 2024-01-16 DIAGNOSIS — H66.90 ACUTE OTITIS MEDIA, UNSPECIFIED OTITIS MEDIA TYPE: Primary | ICD-10-CM

## 2024-01-16 DIAGNOSIS — B37.31 VAGINAL CANDIDA: ICD-10-CM

## 2024-01-16 PROCEDURE — 3074F SYST BP LT 130 MM HG: CPT | Performed by: NURSE PRACTITIONER

## 2024-01-16 PROCEDURE — 3079F DIAST BP 80-89 MM HG: CPT | Performed by: NURSE PRACTITIONER

## 2024-01-16 PROCEDURE — 99213 OFFICE O/P EST LOW 20 MIN: CPT | Performed by: NURSE PRACTITIONER

## 2024-01-16 PROCEDURE — 3008F BODY MASS INDEX DOCD: CPT | Performed by: NURSE PRACTITIONER

## 2024-01-16 RX ORDER — FLUCONAZOLE 150 MG/1
150 TABLET ORAL ONCE
Qty: 1 TABLET | Refills: 0 | Status: SHIPPED | OUTPATIENT
Start: 2024-01-16 | End: 2024-01-16

## 2024-01-16 NOTE — PROGRESS NOTES
HPI:   Marnie Gonzalez is a 38 year old female who presents with ill symptoms for  several  days. Patient reports congestion, bilateral ear pain with right worse than left. Has tried nasal sprays and allergy medications as well as Ibuprofen with mild intermittent relief. 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 She was seen in our [facility] on 11/3/23 and treated with Augmentin BID x 10 days, received another script for Augmentin BID x 10 days from PCP with a refill on 23, and again for URI/chronic sinusitis on 12/15/23 from PCP treated with Cefadroxil 500 mg BID for 15 days, given a refill which she did not . She has not seen ENT yet for symptoms or evaluation.    Current Outpatient Medications   Medication Sig Dispense Refill    escitalopram 20 MG Oral Tab Take 1 tablet (20 mg total) by mouth daily. 90 tablet 3    azelastine 0.1 % Nasal Solution 2 sprays by Nasal route 2 (two) times daily as needed for Rhinitis. 30 mL 1    cetirizine-pseudoephedrine ER (ZYRTEC-D ALLERGY & CONGESTION) 5-120 MG Oral Tablet 12 Hr Take 1 tablet by mouth 2 (two) times daily. 30 tablet 0    fluticasone propionate 50 MCG/ACT Nasal Suspension 2 sprays by Each Nare route daily. 9.9 mL 0     No current facility-administered medications for this visit.      Past Medical History:   Diagnosis Date    Acne     ADD (attention deficit disorder)     Anxiety     Chronic back pain 2014    Depression     Hormone disorder     Induced      Termination of pregnancy (fetus)     D&E at 22 wks gestation      Past Surgical History:   Procedure Laterality Date      2018    D & C  2010    ISMA  2013      Family History   Problem Relation Age of Onset    Cancer Maternal Grandmother     Cancer Maternal Grandfather     Heart Disease Father         CAD    Gastro-Intestinal Disorder Father         celiac disease    Anxiety Father     Hypertension Father     Seizure Disorder Mother     Stroke Neg       Social  History     Socioeconomic History    Marital status:    Tobacco Use    Smoking status: Former     Types: Cigarettes     Quit date: 2010     Years since quittin.0    Smokeless tobacco: Never   Vaping Use    Vaping Use: Never used    Passive vaping exposure: Yes   Substance and Sexual Activity    Alcohol use: Yes     Comment: occ    Drug use: No   Other Topics Concern    Caffeine Concern Yes     Comment: Soda rarely    Exercise Yes         REVIEW OF SYSTEMS:   GENERAL: feels well otherwise  HEENT: congested, as above in HPI  LUNGS: denies shortness of breath with exertion  CARDIOVASCULAR: denies chest pain on exertion  GI: no nausea or abdominal pain, appetite normal  NEURO: denies current headaches    EXAM:   /82   Pulse 83   Temp 97.9 °F (36.6 °C)   Resp 16   Ht 4' 11\" (1.499 m)   Wt 159 lb (72.1 kg)   LMP 2023 (Approximate)   SpO2 97%   BMI 32.11 kg/m²   GENERAL: well developed, well nourished,in no apparent distress, appears very congested but non toxic appearing  HEENT: atraumatic, normocephalic,ears with left TM full and pink/clear, right TM with mild pain with tragus pull, bulging TM reddened, nares with erythema and edema, throat pink. [place]la midline.    NECK: supple,no adenopathy  LUNGS: clear to auscultation all lobes, breathing is non labored  CARDIO: RRR without murmur      ASSESSMENT AND PLAN:    PLAN:Marnie was seen today for ear pain.    Diagnoses and all orders for this visit:    Acute otitis media, unspecified otitis media type    Vaginal candida  -     fluconazole 150 MG Oral Tab; Take 1 tablet (150 mg total) by mouth once for 1 dose.      Patient with OME, viral URI with congestion. She has script refill for Cefadroxil. Advised to  script and continue nasal sprays but next step is ENT for evaluation, patient may need tubes due to continued illness/anatomy for relief. Self care discussed. Medication use and risk/benefit discussed. Patient is advised to  follow up with PCP if not improving with treatment plan or seek immediate care if symptoms worsen.  The patient indicates understanding of these issues and agrees to the plan.  Patient Instructions   Please start Cefodroxil prescription as given for ear pressure.  Continue nasal sprays, use warm pack to face as needed for comfort.  Start Diflucan if you develop vaginal itch.  Take Ibuprofen 600  mg every 8 hours as needed for pain.  Follow up with ENT as discussed for issues.

## 2024-01-16 NOTE — PATIENT INSTRUCTIONS
Please start Cefodroxil prescription as given for ear pressure.  Continue nasal sprays, use warm pack to face as needed for comfort.  Start Diflucan if you develop vaginal itch.  Take Ibuprofen 600  mg every 8 hours as needed for pain.  Follow up with ENT as discussed for issues.

## 2024-02-11 ENCOUNTER — APPOINTMENT (OUTPATIENT)
Dept: GENERAL RADIOLOGY | Facility: HOSPITAL | Age: 39
End: 2024-02-11
Attending: STUDENT IN AN ORGANIZED HEALTH CARE EDUCATION/TRAINING PROGRAM
Payer: COMMERCIAL

## 2024-02-11 ENCOUNTER — HOSPITAL ENCOUNTER (EMERGENCY)
Facility: HOSPITAL | Age: 39
Discharge: HOME | End: 2024-02-11
Attending: STUDENT IN AN ORGANIZED HEALTH CARE EDUCATION/TRAINING PROGRAM
Payer: COMMERCIAL

## 2024-02-11 VITALS
BODY MASS INDEX: 30.24 KG/M2 | HEART RATE: 75 BPM | HEIGHT: 59 IN | DIASTOLIC BLOOD PRESSURE: 75 MMHG | TEMPERATURE: 98 F | OXYGEN SATURATION: 98 % | SYSTOLIC BLOOD PRESSURE: 130 MMHG | WEIGHT: 150 LBS | RESPIRATION RATE: 18 BRPM

## 2024-02-11 DIAGNOSIS — S90.32XA CONTUSION OF LEFT FOOT, INITIAL ENCOUNTER: Primary | ICD-10-CM

## 2024-02-11 PROCEDURE — 99283 EMERGENCY DEPT VISIT LOW MDM: CPT

## 2024-02-11 PROCEDURE — 73630 X-RAY EXAM OF FOOT: CPT | Performed by: STUDENT IN AN ORGANIZED HEALTH CARE EDUCATION/TRAINING PROGRAM

## 2024-02-11 PROCEDURE — 73610 X-RAY EXAM OF ANKLE: CPT | Performed by: STUDENT IN AN ORGANIZED HEALTH CARE EDUCATION/TRAINING PROGRAM

## 2024-02-11 PROCEDURE — 99284 EMERGENCY DEPT VISIT MOD MDM: CPT

## 2024-02-11 RX ORDER — HYDROCODONE BITARTRATE AND ACETAMINOPHEN 5; 325 MG/1; MG/1
1-2 TABLET ORAL EVERY 6 HOURS PRN
Qty: 7 TABLET | Refills: 0 | Status: SHIPPED | OUTPATIENT
Start: 2024-02-11 | End: 2024-02-16

## 2024-02-11 RX ORDER — HYDROCODONE BITARTRATE AND ACETAMINOPHEN 5; 325 MG/1; MG/1
1 TABLET ORAL ONCE
Status: COMPLETED | OUTPATIENT
Start: 2024-02-11 | End: 2024-02-11

## 2024-02-12 NOTE — ED INITIAL ASSESSMENT (HPI)
Pt presents to ed with c/o fall and left leg injury. Pt states she fell down half a set of stairs today and her \"left leg bent weird.\" Pt reports pain from her left shin to her left toe.     Denies loc or hitting her head.  Denies thinners.

## 2024-02-12 NOTE — DISCHARGE INSTRUCTIONS
Take Tylenol ibuprofen as needed for pain.  Ice and elevate the foot.  Use the postop shoe as needed for discomfort.  Call your primary care doctor for follow-up.  Return to the ER immediately with worsening pain numbness weakness or tingling or any new concerns.  Thanks we hope you feel better soon.

## 2024-02-12 NOTE — ED PROVIDER NOTES
Patient Seen in: [facility] Emergency Department      History     Chief Complaint   Patient presents with    Fall    Leg or Foot Injury     Stated Complaint: Fall, leg pain    Subjective:   HPI    38-year-old female otherwise healthy presenting for evaluation of fall and leg pain.  Just prior to arrival fell down stairs at family home.  Sustained injury to left shin and left big toe.  Also has pain on the inside of left ankle.  No numbness weakness or tingling.  Difficulty ambulating due to pain.  No head injury or LOC.  No neck or back pain.    Objective:   Past Medical History:   Diagnosis Date    Acne     ADD (attention deficit disorder)     Anxiety     Chronic back pain 2014    Depression     Hormone disorder     Induced      Termination of pregnancy (fetus)     D&E at 22 wks gestation              Past Surgical History:   Procedure Laterality Date      2018    D & C  2010    ISMA  2013                Social History     Socioeconomic History    Marital status:    Tobacco Use    Smoking status: Former     Types: Cigarettes     Quit date: 2010     Years since quittin.1    Smokeless tobacco: Never   Vaping Use    Vaping Use: Never used    Passive vaping exposure: Yes   Substance and Sexual Activity    Alcohol use: Yes     Comment: occ    Drug use: No   Other Topics Concern    Caffeine Concern Yes     Comment: Soda rarely    Exercise Yes              Review of Systems    Positive for stated complaint: Fall, leg pain  Other systems are as noted in HPI.  Constitutional and vital signs reviewed.      All other systems reviewed and negative except as noted above.    Physical Exam     ED Triage Vitals [24]   /70   Pulse 89   Resp 18   Temp 97.8 °F (36.6 °C)   Temp src Temporal   SpO2 100 %   O2 Device None (Room air)       Current:/70   Pulse 89   Temp 97.8 °F (36.6 °C) (Temporal)   Resp 18   Ht 149.9 cm (4' 11\")   Wt 68 kg   LMP  01/27/2024 (Approximate)   SpO2 100%   BMI 30.30 kg/m²         Physical Exam    Constitutional: awake, alert, no sig distress  HENT: mmm, no lesions,  Neck: normal range of motion, no tenderness, supple.  Eyes: PERRL, EOMI, conjunctiva normal, no discharge. Sclera anicteric.  Cardiovascular: rr no murmur  Respiratory: Normal breath sounds, no respiratory distress, no wheezing, no chest tenderness.  GI: Bowel sounds normal, Soft, no tenderness, no masses, no pulsatile masses.  : No CVA tenderness.  Skin: Warm, dry, no erythema, no rash.  Musculoskeletal: Intact distal pulses, Ecchymosis to the dorsum of the left foot just proximal to left hallux.  Compartments are soft easily compressible.  Mildly tender to palpation left medial malleolus and left distal tib-fib region.  No fibular head tenderness.  Back- No tenderness.  Neurologic: Alert & oriented x 3, normal motor function, normal sensory function, no focal deficits noted.  Psych: Calm, cooperative, nl affect        ED Course   Labs Reviewed - No data to display          Independently reviewed patient's plain radiographs of ankle and foot, no evidence of acute bony abnormalities fractures or subluxation.         MDM      38-year-old female with history as documented above presenting with pain after fall and left lower extremity, with area of maximum tenderness in the left MTP region.  On arrival vitals are stable and reassuring.  Compartments soft is compressible neurovascular intact.  DDx: Metatarsal fracture, proximal phalanx fracture, medial malleolus fracture or distal fibular fracture, or soft tissue contusion  Plan x-ray reassess  Given Norco for pain control      Difficulty ambulating due to pain on stiff soled postop shoe so we will give crutches for comfort and encourage podiatry follow-up.  Patient knows return for worsening pain numbness weakness or tingling or any new concerns.  Return precautions and follow-up instructions were discussed with  patient who voiced understanding and agreement the plan.  All questions were answered to patient satisfaction.                           Medical Decision Making      Disposition and Plan     Clinical Impression:  1. Contusion of left foot, initial encounter         Disposition:  Discharge  2/11/2024  9:26 pm    Follow-up:  D'Amico, Jeff Anthony, DO  [address] [facility] [address]  [phone]    Follow up in 2 day(s)      [facility] Department  [address] [facility] [id number] [phone]  Follow up  As needed, If symptoms worsen    [facility]  [address] [facility] [address] [phone]  Call            Medications Prescribed:  Current Discharge Medication List        START taking these medications    Details   HYDROcodone-acetaminophen 5-325 MG Oral Tab Take 1-2 tablets by mouth every 6 (six) hours as needed for Pain.  Qty: 7 tablet, Refills: 0    Associated Diagnoses: Contusion of left foot, initial encounter

## 2024-02-12 NOTE — ED QUICK NOTES
Patient provided with discharge instructions. Verbalized understanding for plan of care at home and follow up. All question and concerns addressed prior to discharge. Let pt know rx was sent to pharmacy.

## 2024-02-14 DIAGNOSIS — R09.81 NASAL CONGESTION: ICD-10-CM

## 2024-02-14 RX ORDER — AZELASTINE HYDROCHLORIDE 137 UG/1
SPRAY, METERED NASAL
Qty: 30 ML | Refills: 11 | Status: SHIPPED | OUTPATIENT
Start: 2024-02-14

## 2024-02-14 NOTE — TELEPHONE ENCOUNTER
Noted pt comment    Refill request is for a maintenance medication and has met the criteria specified in the Ambulatory Medication Refill Standing Order for eligibility, visits, laboratory, alerts and was sent to the requested pharmacy.    Requested Prescriptions     Signed Prescriptions Disp Refills    azelastine 137 MCG/SPRAY Nasal Solution 30 mL 11     Sig: instill 2 sprays intranasally twice daily as needed for rhinitis     Authorizing Provider: D'AMICO, JEFF ANTHONY     Ordering User: RACHAEL COLLINS

## 2024-02-16 ENCOUNTER — HOSPITAL ENCOUNTER (OUTPATIENT)
Dept: GENERAL RADIOLOGY | Facility: HOSPITAL | Age: 39
Discharge: HOME | End: 2024-02-16
Payer: COMMERCIAL

## 2024-02-16 DIAGNOSIS — S93.325A DISLOCATION OF TARSOMETATARSAL JOINT OF LEFT FOOT: ICD-10-CM

## 2024-02-16 DIAGNOSIS — M79.605 PAIN IN LEFT LEG: ICD-10-CM

## 2024-02-16 PROCEDURE — 73590 X-RAY EXAM OF LOWER LEG: CPT

## 2024-03-01 ENCOUNTER — TELEPHONE (OUTPATIENT)
Dept: INTERNAL MEDICINE CLINIC | Facility: CLINIC | Age: 39
End: 2024-03-01

## 2024-03-02 NOTE — TELEPHONE ENCOUNTER
Called answering service c/o sinus congestion/pressure. Denied fevers, cough. She is taking decongestants and anti-histamines as recommended previously by PCP. Has appointment with ENT in 2 weeks. Asking if she should take cefadroxil previously prescribed by Dr. D'Amico. Reviewed TE from 12/15/23, patient will take abx as prescribed and f/u w/ENT in 2 weeks. Informed patient to call Dr. D'Amico with sx update in the next week.    To RN staff: please call patient in 1 week to f/u sx if patient hasn't already corresponded.    Thank you!

## 2024-03-06 ENCOUNTER — OFFICE VISIT (OUTPATIENT)
Dept: FAMILY MEDICINE CLINIC | Facility: CLINIC | Age: 39
End: 2024-03-06
Payer: COMMERCIAL

## 2024-03-06 VITALS
WEIGHT: 154 LBS | DIASTOLIC BLOOD PRESSURE: 66 MMHG | OXYGEN SATURATION: 97 % | SYSTOLIC BLOOD PRESSURE: 106 MMHG | BODY MASS INDEX: 31.04 KG/M2 | TEMPERATURE: 98 F | HEIGHT: 59 IN | HEART RATE: 79 BPM | RESPIRATION RATE: 16 BRPM

## 2024-03-06 DIAGNOSIS — J01.00 ACUTE NON-RECURRENT MAXILLARY SINUSITIS: Primary | ICD-10-CM

## 2024-03-06 PROCEDURE — 3078F DIAST BP <80 MM HG: CPT | Performed by: PHYSICIAN ASSISTANT

## 2024-03-06 PROCEDURE — 99213 OFFICE O/P EST LOW 20 MIN: CPT | Performed by: PHYSICIAN ASSISTANT

## 2024-03-06 PROCEDURE — 3008F BODY MASS INDEX DOCD: CPT | Performed by: PHYSICIAN ASSISTANT

## 2024-03-06 PROCEDURE — 3074F SYST BP LT 130 MM HG: CPT | Performed by: PHYSICIAN ASSISTANT

## 2024-03-06 RX ORDER — METHYLPREDNISOLONE 4 MG/1
TABLET ORAL
Qty: 1 EACH | Refills: 0 | Status: SHIPPED | OUTPATIENT
Start: 2024-03-06

## 2024-03-06 NOTE — TELEPHONE ENCOUNTER
Pt. Called stating she is worse now.  Her face is puffy under her eyes.  She now has a sore throat & a cough, she has congestion.  She feels exhausted.  Still no fever or body aches.

## 2024-03-06 NOTE — PROGRESS NOTES
CHIEF COMPLAINT:     Chief Complaint   Patient presents with    Sore Throat     Congestion sinus pressure for one week        HPI:   Marnie Gonzalez is a 38 year old female who presents for cold symptoms for one week.  No fever. No body aches/chills. No headache. + nasal congestion. + sinus pressure. + b/l ear pain/popping. + sore throat for 3 days. Dry cough. No chest pain or SOB. No GI symptoms. No covid at home testing. Hx of sinus infections- ENT gave her cefadroxil- taking for 5 days and only mild improvement. Sinus pressure is the worst symptom. Taking Flonase and decongestant       Current Outpatient Medications   Medication Sig Dispense Refill    methylPREDNISolone (MEDROL) 4 MG Oral Tablet Therapy Pack As directed. 1 each 0    azelastine 137 MCG/SPRAY Nasal Solution instill 2 sprays intranasally twice daily as needed for rhinitis 30 mL 11    escitalopram 20 MG Oral Tab Take 1 tablet (20 mg total) by mouth daily. 90 tablet 3    cetirizine-pseudoephedrine ER (ZYRTEC-D ALLERGY & CONGESTION) 5-120 MG Oral Tablet 12 Hr Take 1 tablet by mouth 2 (two) times daily. 30 tablet 0    fluticasone propionate 50 MCG/ACT Nasal Suspension 2 sprays by Each Nare route daily. 9.9 mL 0      Past Medical History:   Diagnosis Date    Acne     ADD (attention deficit disorder)     Anxiety     Chronic back pain 2014    Depression     Hormone disorder     Induced      Termination of pregnancy (fetus)     D&E at 22 wks gestation      Past Surgical History:   Procedure Laterality Date      2018    D & C  2010    ISMA  2013      Family History   Problem Relation Age of Onset    Cancer Maternal Grandmother     Cancer Maternal Grandfather     Heart Disease Father         CAD    Gastro-Intestinal Disorder Father         celiac disease    Anxiety Father     Hypertension Father     Seizure Disorder Mother     Stroke Neg       Social History     Socioeconomic History    Marital status:     Tobacco Use    Smoking status: Former     Types: Cigarettes     Quit date: 2010     Years since quittin.1    Smokeless tobacco: Never   Vaping Use    Vaping Use: Never used    Passive vaping exposure: Yes   Substance and Sexual Activity    Alcohol use: Yes     Comment: occ    Drug use: No   Other Topics Concern    Caffeine Concern Yes     Comment: Soda rarely    Exercise Yes         REVIEW OF SYSTEMS:   GENERAL:  denies  diminished appetite  SKIN: no rashes or abnormal skin lesions  HEENT: See HPI.    LUNGS: denies shortness of breath or wheezing, See HPI  CARDIOVASCULAR: denies chest pain or palpitations   GI: denies N/V/C or abdominal pain  NEURO: + sinus headaches.  No numbness or tingling in face.    EXAM:   /66   Pulse 79   Temp 98.2 °F (36.8 °C) (Oral)   Resp 16   Ht 4' 11\" (1.499 m)   Wt 154 lb (69.9 kg)   LMP 2024 (Approximate)   SpO2 97%   BMI 31.10 kg/m²   Physical Exam  Constitutional:       General: She is not in acute distress.     Appearance: Normal appearance.   HENT:      Head: Normocephalic.      Right Ear: Tympanic membrane, ear canal and external ear normal.      Left Ear: Tympanic membrane, ear canal and external ear normal.      Nose: No rhinorrhea.      Right Sinus: Maxillary sinus tenderness present.      Left Sinus: Maxillary sinus tenderness present.      Mouth/Throat:      Mouth: Mucous membranes are moist.      Pharynx: Oropharynx is clear. Uvula midline. No posterior oropharyngeal erythema.      Tonsils: No tonsillar exudate.   Eyes:      Conjunctiva/sclera: Conjunctivae normal.      Pupils: Pupils are equal, round, and reactive to light.   Cardiovascular:      Rate and Rhythm: Normal rate and regular rhythm.      Heart sounds: Normal heart sounds. No murmur heard.  Pulmonary:      Effort: Pulmonary effort is normal. No respiratory distress.      Breath sounds: Normal breath sounds. No wheezing or rhonchi.   Chest:      Chest wall: No tenderness.    Musculoskeletal:      Cervical back: Normal range of motion and neck supple.   Lymphadenopathy:      Cervical: No cervical adenopathy.   Skin:     General: Skin is warm.      Findings: No rash.   Neurological:      Mental Status: She is alert and oriented to person, place, and time.          No results found for this or any previous visit (from the past 24 hour(s)).    ASSESSMENT AND PLAN:   Marnie Gonzalez is a 38 year old female who presents with URI symptoms that are consistent with:      ASSESSMENT:  Encounter Diagnosis   Name Primary?    Acute non-recurrent maxillary sinusitis Yes         PLAN: Meds as below.  Comfort care instructions as listed in Patient Instructions    Meds & Refills for this Visit:  Requested Prescriptions     Signed Prescriptions Disp Refills    methylPREDNISolone (MEDROL) 4 MG Oral Tablet Therapy Pack 1 each 0     Sig: As directed.       Risks, benefits, side effects of medication addressed and explained.    Patient Instructions   Pressure likely related to eustachian tube dysfunction. Continue Flonase. Will add oral steroid. Close ENT follow up as scheduled   Continue antibiotic given by ENT until finished   Start steroid. Take with food. No ibuprofen or aleve with medication   Continue Zyrtec OTC daily   Rest   Fluids   Please follow up with PCP if no improvement or if symptoms worsen      The patient indicates understanding of these issues and agrees to the plan.  The patient is asked to f/u with PCP if sx's persist or worsen.

## 2024-03-06 NOTE — TELEPHONE ENCOUNTER
Called patient who now developed sore throat - ears popping, was on antibiotics before. Mariaa with ENT is in 2 weeks - RN advised to be evaluated at [facility] - agrees F/U 3/7

## 2024-03-06 NOTE — PATIENT INSTRUCTIONS
Pressure likely related to eustachian tube dysfunction. Continue Flonase. Will add oral steroid. Close ENT follow up as scheduled   Continue antibiotic given by ENT until finished   Start steroid. Take with food. No ibuprofen or aleve with medication   Continue Zyrtec OTC daily   Rest   Fluids   Please follow up with PCP if no improvement or if symptoms worsen

## 2024-03-07 NOTE — TELEPHONE ENCOUNTER
Called pt and LVM. Asked pt to CB with a status report and if she wishes to schedule an appt with Dr FORDE for his respiratory clinic for tomorrow.

## 2024-03-07 NOTE — TELEPHONE ENCOUNTER
Noted, lets offer her the respiratory clinic appt tomorrow morning if needed.  Nursing lets reach out to her today and see how she is doing

## 2024-03-07 NOTE — TELEPHONE ENCOUNTER
Pt returned call  Advises she as seen at  in [place]   Sinus' under pt's eyes are backed up and that is why infections are not clearing  [facility] prescribed 6 days of prednisone 4 mg   Also taking the cetirizine Dr DAmico prescribed    Pt states she is still stuffed and puffed, face is swollen and puffy, icing when she goes to bed     Patient is scheduled to see a [facility] on Friday March 22   ENT was recommended by her co worker    Please call if Dr DAmico recommends something else   Pharmacy - [facility] ([address])   [facility] system is down and not accepting any new prescription

## 2024-03-07 NOTE — TELEPHONE ENCOUNTER
LAXMI FORDE--    Per chart review, patient was seen and evaluated at [facility] with instructions to f/u with PCP if no improvement or worsening symptoms  Refer to [facility] report.     Encounter Diagnosis   Name Primary?    Acute non-recurrent maxillary sinusitis Yes            PLAN: Meds as below.  Comfort care instructions as listed in Patient Instructions     Meds & Refills for this Visit:  Requested Prescriptions             Signed Prescriptions Disp Refills    methylPREDNISolone (MEDROL) 4 MG Oral Tablet Therapy Pack 1 each 0       Sig: As directed.

## 2024-04-22 ENCOUNTER — TELEPHONE (OUTPATIENT)
Dept: INTERNAL MEDICINE CLINIC | Facility: CLINIC | Age: 39
End: 2024-04-22

## 2024-04-22 DIAGNOSIS — F41.9 ANXIETY: ICD-10-CM

## 2024-04-22 RX ORDER — ESCITALOPRAM OXALATE 20 MG/1
20 TABLET ORAL DAILY
Qty: 90 TABLET | Refills: 3 | Status: CANCELLED | OUTPATIENT
Start: 2024-04-22

## 2024-04-23 RX ORDER — ESCITALOPRAM OXALATE 20 MG/1
20 TABLET ORAL DAILY
Qty: 90 TABLET | Refills: 3 | Status: SHIPPED | OUTPATIENT
Start: 2024-04-23

## 2024-04-23 NOTE — TELEPHONE ENCOUNTER
1 year was sent in jan. She needs to contact pharmacy    Current refill request refused due to refill is either a duplicate request or has active refills at the pharmacy.  Check previous templates.    Requested Prescriptions     Pending Prescriptions Disp Refills    escitalopram 20 MG Oral Tab 90 tablet 3     Sig: Take 1 tablet (20 mg total) by mouth daily.

## 2024-04-23 NOTE — TELEPHONE ENCOUNTER
Okay to refill early, if I need to change the dosing or frequency to accommodate for lost or stolen meds, let me know otherwise I sent in a new prescription refill.  Nursing let the patient know.

## 2024-04-23 NOTE — TELEPHONE ENCOUNTER
Please advise - called patient who dropped Lexapro on bathroom floor and threw them out- not sure how many -to soon for refill - to

## 2024-04-23 NOTE — TELEPHONE ENCOUNTER
[facility] called stating the pt. Arrived there last night through the drive through to  a script for Escitalopram. [facility] states it's to early for a refill as her next refill can be dispensed on 6/1/24.  Pt. Told Pharmacist that she dropped them on the floor and she through them away.  Please advise on early fill.

## 2024-07-30 NOTE — TELEPHONE ENCOUNTER
Dr. Angel Duque - there are 3 different MRI orders (listed below) MRI department needs clarification on which one they should be doing. Please clarify, do you want the MRA of head and neck, as well as, the brain and cervical MRI W+WO?    Or only the Brain a
Have them do the MRI  brain and c spine with and without   - ok to cancel the MRA
Heath Olmedo, states that the patient is scheduled for an MRI tomorrow and they need clarification on the order. Per, Heath Olmedo, there are two order and they would like to know which one they should be doing.
Spoke to MRI and informed to cancel MRA and keep the other two.
The patient was asked if she would like a chaperone present for her intimate exam. She  Declined the chaperone. Cristóbal Soliman CMA (AAMA)    
skin change or tenderness.   Abdominal:      General: There is no distension.      Palpations: Abdomen is soft. There is no mass.      Tenderness: There is no abdominal tenderness. There is no guarding or rebound.      Hernia: No hernia is present. There is no hernia in the left inguinal area or right inguinal area.   Genitourinary:     General: Normal vulva.      Pubic Area: No rash.       Labia:         Right: No rash, tenderness, lesion or injury.         Left: No rash, tenderness, lesion or injury.       Urethra: No prolapse, urethral swelling or urethral lesion.      Vagina: Normal. No signs of injury and foreign body. No vaginal discharge, erythema, tenderness or bleeding.      Cervix: No cervical motion tenderness, discharge or friability.      Uterus: Not deviated, not enlarged, not fixed and not tender.       Adnexa:         Right: No mass, tenderness or fullness.          Left: No mass, tenderness or fullness.        Rectum: Normal.   Musculoskeletal:         General: No tenderness. Normal range of motion.      Cervical back: Normal range of motion and neck supple.   Lymphadenopathy:      Cervical: No cervical adenopathy.      Upper Body:      Right upper body: No supraclavicular or axillary adenopathy.      Left upper body: No supraclavicular or axillary adenopathy.      Lower Body: No right inguinal adenopathy. No left inguinal adenopathy.   Skin:     General: Skin is warm and dry.      Coloration: Skin is not pale.      Findings: No erythema or rash.   Neurological:      Mental Status: She is alert and oriented to person, place, and time.   Psychiatric:         Behavior: Behavior normal.         Thought Content: Thought content normal.         Judgment: Judgment normal.         Assessment:       Diagnosis Orders   1. Encounter for well woman exam with routine gynecological exam             Plan:      Medications placedthis encounter:  Orders Placed This Encounter   Medications    norethindrone

## 2024-08-26 ENCOUNTER — OFFICE VISIT (OUTPATIENT)
Dept: FAMILY MEDICINE CLINIC | Facility: CLINIC | Age: 39
End: 2024-08-26
Payer: COMMERCIAL

## 2024-08-26 VITALS
OXYGEN SATURATION: 95 % | TEMPERATURE: 98 F | BODY MASS INDEX: 31 KG/M2 | HEART RATE: 78 BPM | SYSTOLIC BLOOD PRESSURE: 110 MMHG | RESPIRATION RATE: 16 BRPM | WEIGHT: 153 LBS | DIASTOLIC BLOOD PRESSURE: 82 MMHG

## 2024-08-26 DIAGNOSIS — R09.81 CHRONIC NASAL CONGESTION: Primary | ICD-10-CM

## 2024-08-26 LAB
OPERATOR ID: NORMAL
RAPID SARS-COV-2 BY PCR: NOT DETECTED

## 2024-08-26 RX ORDER — METHYLPREDNISOLONE 4 MG
TABLET, DOSE PACK ORAL
Qty: 1 EACH | Refills: 0 | Status: SHIPPED | OUTPATIENT
Start: 2024-08-26

## 2024-08-26 NOTE — PROGRESS NOTES
Marnie Gonzalez is a 38 year old female.   Chief Complaint   Patient presents with    Nasal Congestion     Sx onset Thur w ST, stuffy nose, L ear pain, fluid came out of R ear  Denies fever, cough   No recent Covid test  OTC Claritin, 2 diff nasal sprays      HPI:    Symptoms started  4 days ago with severe nasal congestion with sinus pressure. Patient reports history of sinusitis and is under care of ENT. She has appointment scheduled with ENT on 09/06/24 but she reports being unable to breathe freely through her nose. She takes Claritin D daily as well as Flonase and Azelastine sprays. She reports Sudafed makes her too dry and has not been using it. She denies use of Afrin OTC.  She feels well otherwise besides the reported nasal congestion.      Allergies:  Allergies   Allergen Reactions    Bacitracin Zinc  [Bacitracin] RASH    Bacitracin-Polymyxin B UNKNOWN    Benzalkonium Chloride RASH    Gramicidin RASH    Neomycin Sulfate  [Neomycin] RASH    Neosporin [Neomycin-Bacitracin-Polymyxin] RASH    Polymyxin B Sulfate  [Polymyxin B] RASH    Sulfa Antibiotics OTHER (SEE COMMENTS)     Noted during infancy.    Sulfanilamide UNKNOWN          Current Outpatient Medications   Medication Sig Dispense Refill    methylPREDNISolone (MEDROL) 4 MG Oral Tablet Therapy Pack As directed. 1 each 0    escitalopram 20 MG Oral Tab Take 1 tablet (20 mg total) by mouth daily. 90 tablet 3    methylPREDNISolone (MEDROL) 4 MG Oral Tablet Therapy Pack As directed. 1 each 0    azelastine 137 MCG/SPRAY Nasal Solution instill 2 sprays intranasally twice daily as needed for rhinitis 30 mL 11    cetirizine-pseudoephedrine ER (ZYRTEC-D ALLERGY & CONGESTION) 5-120 MG Oral Tablet 12 Hr Take 1 tablet by mouth 2 (two) times daily. 30 tablet 0    fluticasone propionate 50 MCG/ACT Nasal Suspension 2 sprays by Each Nare route daily. 9.9 mL 0      Past Medical History:    Acne    ADD (attention deficit disorder)    Anxiety    Chronic back pain     Depression    Hormone disorder    Induced     Termination of pregnancy (fetus)    D&E at 22 wks gestation      Past Surgical History:   Procedure Laterality Date      2018    D & c  2010    [facility]              ROS:   GENERAL HEALTH: otherwise feels well  EYES: no visual complaints or deficits  RESPIRATORY: no shortness of breath, wheezing   CARDIOVASCULAR: no chest pain or SOB.  GI: denies nausea, vomiting.  PSYCHE: no symptoms of depression or anxiety      PE:  /82   Pulse 78   Temp 98.2 °F (36.8 °C)   Resp 16   Wt 153 lb (69.4 kg)   LMP 2024 (Approximate)   SpO2 95%   BMI 30.90 kg/m²   General: WD/WN in no acute distress.   Eyes & ears: conjunctiva clear, sclera white; TM’s non-bulging without erythema.   Sinuses maxillary: non- tender to percussion.   Nares: pink mucosa and no discharge, + septal deviations.   Mouth: moist with mild erythema, no exudates, and + PND, + cobblestoning.  Neck: Supple with no cervical LAD.   Lungs: Clear to auscultation bilaterally; no wheeze, rhonchi, or rales.    Heart: S1/S2 regular no murmurs.      ASSESSMENT/ PLAN:     Encounter Diagnosis   Name Primary?    Chronic nasal congestion Yes       Orders Placed This Encounter   Procedures    Rapid Covid-19   NEGATIVE    Meds & Refills for this Visit:  Requested Prescriptions     Signed Prescriptions Disp Refills    methylPREDNISolone (MEDROL) 4 MG Oral Tablet Therapy Pack 1 each 0     Sig: As directed.       Imaging & Consults:  None    Trial of Medrol Pack  Keep ENT appointment  Normal saline sprays PRN  Covid negative

## 2024-09-12 ENCOUNTER — TELEPHONE (OUTPATIENT)
Dept: INTERNAL MEDICINE CLINIC | Facility: CLINIC | Age: 39
End: 2024-09-12

## 2024-09-12 NOTE — TELEPHONE ENCOUNTER
Patient is calling on Tuesday she had a dry throat, cough which started yesterday, tired states she feels off    Patient has been taking OTC medication it is not helping   Covid test was negative this morning    Patient is requesting an order for an antibiotic to be called into [facility]    Please call [phone]

## 2024-09-12 NOTE — TELEPHONE ENCOUNTER
URI Triage:    Fever: Yes[x]  No[]  Unknown[] Hasn't measured temperature. Denies feeling feverish.   [x] Temperature: 9/12 97.9F  [] Chills  [] Night sweats  [] Body aches    Cough: Yes[x]  No[]  [] Productive cough  [] Cough with exertion  [x] Dry cough (intermittent & infrequent)    Respiratory Symptoms: Yes[]  No[x]  [] Wheezing  [] Pain with deep breathing  [] SOB with exertion  [] SOB at rest  [] Heavy breathing  [] Chest discomfort with deep breathing or coughing    GI Symptoms: Yes [] No[x]  [] Diarrhea  [] Nausea  [] Vomiting  [] Upset stomach  [] Lack of appetite    Other symptoms:  [x] Sore throat ('sore\")  [] Sinus pressure  [] Nasal drainage  [] Nasal congestion  [] Chest congestion  [] Head congestion  [] PND  [] Facial pain   [] Earache   [] Conjunctivitis  [] Headache  [x] Fatigue  [x] Weakness  [] Loss of sense of smell   [] Loss of sense of taste    [x]OTC Medications: DayQuil, Night time cough medicine     [] Any recent travel  [x] Any sick contacts    [] Positive Covid exposure (<6 feet, >15 min, no mask worn)  If yes, date of exposure (last contact):     [x] Covid Test  Date/Result: 9/13       Vaccinated (covid): Yes  [x]   No []  Booster:  Yes  []  No [x]    Symptom onset: 9/10/24    To Dr. FORDE--please advise on request for prescription or whatever you suggest to help with symptoms.     Patient is an . 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   All children are required to be seen by pediatrician before returning to center. No reports of flu or covid.     Offered phone visit for tomorrow. Declined due to work schedule conflict.   Pt states that she is available today for phone call.     [id number] - [place], [place] - [address] [place] [address] [phone], [phone]     Patient was notified that this message will be routed to the physician to determine what their recommendation (s) would be. In the meantime, if they develop new or  worsening symptoms, they were advised to call back or seek emergent evaluation at the ER. ER precautions reviewed.   Reviewed business hours and the after-hour service for the on-call physician, I.e, concerns/questions.

## 2024-11-07 NOTE — PROGRESS NOTES
Patient called me on call for refill of her BuSpar, has recently run out had a problem with the pharmacy filling the medication, medication was sent to her pharmacy she verbalized understanding. done

## 2024-12-23 ENCOUNTER — TELEPHONE (OUTPATIENT)
Dept: INTERNAL MEDICINE CLINIC | Facility: CLINIC | Age: 39
End: 2024-12-23

## 2024-12-23 DIAGNOSIS — F41.9 ANXIETY: ICD-10-CM

## 2024-12-23 RX ORDER — ESCITALOPRAM OXALATE 20 MG/1
20 TABLET ORAL DAILY
Qty: 30 TABLET | Refills: 0 | Status: SHIPPED | OUTPATIENT
Start: 2024-12-23

## 2024-12-23 NOTE — TELEPHONE ENCOUNTER
Please call patient [phone]     Patient misplaced her Escitalopram medication    Not due for refill until Jan 5    Patient has not taken the medication for a couple of days - requesting new rx or refill to cover until Jan 5     Pharmacy - [facility]

## 2025-01-19 DIAGNOSIS — F41.9 ANXIETY: ICD-10-CM

## 2025-01-20 RX ORDER — ESCITALOPRAM OXALATE 20 MG/1
20 TABLET ORAL DAILY
Qty: 30 TABLET | Refills: 0 | OUTPATIENT
Start: 2025-01-20

## 2025-01-20 NOTE — TELEPHONE ENCOUNTER
Duplicate     Current refill request refused due to refill is either a duplicate request or has active refills at the pharmacy.  Check previous templates.    Requested Prescriptions     Pending Prescriptions Disp Refills    ESCITALOPRAM 20 MG Oral Tab [Pharmacy Med Name: Escitalopram Oxalate 20 Mg Tab Solc] 30 tablet 0     Sig: Take 1 tablet (20 mg total) by mouth daily.

## 2025-03-03 DIAGNOSIS — F41.9 ANXIETY: ICD-10-CM

## 2025-03-04 RX ORDER — ESCITALOPRAM OXALATE 20 MG/1
20 TABLET ORAL DAILY
Qty: 90 TABLET | Refills: 0 | Status: SHIPPED | OUTPATIENT
Start: 2025-03-04

## 2025-03-04 NOTE — TELEPHONE ENCOUNTER
Ramakrishna due for visit     Refill request is for a maintenance medication and has met the criteria specified in the Ambulatory Medication Refill Standing Order for eligibility, visits, laboratory, alerts and was sent to the requested pharmacy.    Requested Prescriptions     Signed Prescriptions Disp Refills    ESCITALOPRAM 20 MG Oral Tab 90 tablet 0     Sig: TAKE ONE TABLET BY MOUTH ONE TIME DAILY     Authorizing Provider: D'AMICO, JEFF ANTHONY     Ordering User: RACHAEL COLLINS

## 2025-04-02 ENCOUNTER — OFFICE VISIT (OUTPATIENT)
Dept: FAMILY MEDICINE CLINIC | Facility: CLINIC | Age: 40
End: 2025-04-02
Payer: COMMERCIAL

## 2025-04-02 VITALS
HEART RATE: 75 BPM | DIASTOLIC BLOOD PRESSURE: 60 MMHG | RESPIRATION RATE: 16 BRPM | OXYGEN SATURATION: 99 % | BODY MASS INDEX: 29 KG/M2 | TEMPERATURE: 98 F | WEIGHT: 146 LBS | SYSTOLIC BLOOD PRESSURE: 108 MMHG

## 2025-04-02 DIAGNOSIS — J02.9 SORE THROAT: Primary | ICD-10-CM

## 2025-04-02 DIAGNOSIS — H65.93 FLUID LEVEL BEHIND TYMPANIC MEMBRANE, BILATERAL: ICD-10-CM

## 2025-04-02 DIAGNOSIS — R09.81 NASAL CONGESTION: ICD-10-CM

## 2025-04-02 LAB
CONTROL LINE PRESENT WITH A CLEAR BACKGROUND (YES/NO): YES YES/NO
KIT LOT #: NORMAL NUMERIC
STREP GRP A CUL-SCR: NEGATIVE

## 2025-04-02 PROCEDURE — 3074F SYST BP LT 130 MM HG: CPT

## 2025-04-02 PROCEDURE — 87880 STREP A ASSAY W/OPTIC: CPT

## 2025-04-02 PROCEDURE — 99213 OFFICE O/P EST LOW 20 MIN: CPT

## 2025-04-02 PROCEDURE — 3078F DIAST BP <80 MM HG: CPT

## 2025-04-02 RX ORDER — LORATADINE 10 MG/1
10 TABLET ORAL DAILY
COMMUNITY

## 2025-04-02 NOTE — PROGRESS NOTES
Subjective:   Patient ID: Marnie Gonzalez is a 39 year old female.    Patient presents to clinic with complaints of sore throat, bilateral ear pain and nasal congestion/sinus pressure. Reports that symptoms started on 04/01. Reports daughter getting over influenza. Has taken Claritin for symptoms. Denies fever.    Sinus Problem  This is a new problem. The current episode started yesterday. The problem is unchanged. There has been no fever. Associated symptoms include congestion, ear pain, sinus pressure and a sore throat.       History/Other:   Review of Systems   Constitutional:  Negative for fatigue and fever.   HENT:  Positive for congestion, ear pain, sinus pressure and sore throat.    All other systems reviewed and are negative.    Current Outpatient Medications   Medication Sig Dispense Refill    loratadine 10 MG Oral Tab Take 1 tablet (10 mg total) by mouth daily.      ESCITALOPRAM 20 MG Oral Tab TAKE ONE TABLET BY MOUTH ONE TIME DAILY 90 tablet 0    methylPREDNISolone (MEDROL) 4 MG Oral Tablet Therapy Pack As directed. (Patient not taking: Reported on 4/2/2025) 1 each 0    methylPREDNISolone (MEDROL) 4 MG Oral Tablet Therapy Pack As directed. (Patient not taking: Reported on 4/2/2025) 1 each 0    azelastine 137 MCG/SPRAY Nasal Solution instill 2 sprays intranasally twice daily as needed for rhinitis 30 mL 11    cetirizine-pseudoephedrine ER (ZYRTEC-D ALLERGY & CONGESTION) 5-120 MG Oral Tablet 12 Hr Take 1 tablet by mouth 2 (two) times daily. (Patient not taking: Reported on 4/2/2025) 30 tablet 0    fluticasone propionate 50 MCG/ACT Nasal Suspension 2 sprays by Each Nare route daily. (Patient not taking: Reported on 4/2/2025) 9.9 mL 0     Allergies:Allergies[1]    Objective:   Physical Exam  Vitals reviewed.   Constitutional:       General: She is not in acute distress.     Appearance: Normal appearance. She is not ill-appearing or toxic-appearing.   HENT:      Head: Normocephalic and atraumatic.       Right Ear: Ear canal and external ear normal. A middle ear effusion is present. Tympanic membrane is not erythematous, retracted or bulging.      Left Ear: Ear canal and external ear normal. A middle ear effusion is present. Tympanic membrane is not erythematous, retracted or bulging.      Ears:      Comments: Clear fluid bilaterally     Nose: Congestion present.      Right Sinus: No maxillary sinus tenderness or frontal sinus tenderness.      Left Sinus: No maxillary sinus tenderness or frontal sinus tenderness.      Mouth/Throat:      Mouth: Mucous membranes are moist.      Pharynx: Oropharynx is clear. Postnasal drip present. No posterior oropharyngeal erythema.   Cardiovascular:      Rate and Rhythm: Normal rate and regular rhythm.      Pulses: Normal pulses.      Heart sounds: Normal heart sounds.   Pulmonary:      Effort: Pulmonary effort is normal. No respiratory distress.      Breath sounds: Normal breath sounds. No wheezing, rhonchi or rales.   Musculoskeletal:         General: Normal range of motion.      Cervical back: Normal range of motion and neck supple.   Lymphadenopathy:      Cervical: No cervical adenopathy.   Skin:     General: Skin is warm and dry.      Capillary Refill: Capillary refill takes less than 2 seconds.   Neurological:      General: No focal deficit present.      Mental Status: She is alert and oriented to person, place, and time.   Psychiatric:         Mood and Affect: Mood normal.         Behavior: Behavior normal.         Assessment & Plan:   1. Sore throat    2. Fluid level behind tympanic membrane, bilateral    3. Nasal congestion        Orders Placed This Encounter   Procedures    Strep A Assay W/Optic     Results for orders placed or performed in visit on 04/02/25   Strep A Assay W/Optic    Collection Time: 04/02/25 11:46 AM   Result Value Ref Range    Strep Grp A Screen negative Negative    Control Line Present with a clear background (yes/no) yes Yes/No    Kit Lot # [id number]  Numeric    Kit Expiration Date 12/20/2025 Date     Discussion about supportive treatment including over the counter medications, hydration and rest. Recommended to start flonase 2 sprays in each nostril at bedtime. Declined viral testing. Follow up with PCP if symptoms continue.       Meds This Visit:  Requested Prescriptions      No prescriptions requested or ordered in this encounter       Imaging & Referrals:  None         [1]   Allergies  Allergen Reactions    Bacitracin Zinc  [Bacitracin] RASH    Bacitracin-Polymyxin B UNKNOWN    Benzalkonium Chloride RASH    Gramicidin RASH    Neomycin Sulfate  [Neomycin] RASH    Neosporin [Neomycin-Bacitracin-Polymyxin] RASH    Polymyxin B Sulfate  [Polymyxin B] RASH    Sulfa Antibiotics OTHER (SEE COMMENTS)     Noted during infancy.    Sulfanilamide UNKNOWN

## 2025-04-15 ENCOUNTER — TELEPHONE (OUTPATIENT)
Dept: INTERNAL MEDICINE CLINIC | Facility: CLINIC | Age: 40
End: 2025-04-15

## 2025-04-15 NOTE — TELEPHONE ENCOUNTER
Patient feels she may need a new anxiety med or the dosage changed    Patient is feeling more anxious  She is grinding her teeth   Feels out of sorts, stressed    Does patient need to see the Dr Damico for this  When can she be added to the schedule    Please call to triage/advise [phone]

## 2025-04-15 NOTE — TELEPHONE ENCOUNTER
First opening with Dr.Damico is on 4/22/25.     Dr.Damico did you want to see patient? Add as virtual phone visit to discuss? See an associate?

## 2025-04-16 NOTE — TELEPHONE ENCOUNTER
Nursing lets reach out to her, confirm that she is taking the Lexapro 20 mg.  She has some options there, she can try to go up to a pill and a half to make it 30 mg temporarily, or we can add on something like BuSpar 5 mg twice daily.  I still like the idea of her taking my first available appointment and following up in the office if she can.

## 2025-04-16 NOTE — TELEPHONE ENCOUNTER
To  - called patient and relayed  message - she will start with 1.5 tablets of Lexapro  which will be 30 mg -when can you see her - she is available anytime

## 2025-04-18 ENCOUNTER — TELEPHONE (OUTPATIENT)
Dept: INTERNAL MEDICINE CLINIC | Facility: CLINIC | Age: 40
End: 2025-04-18

## 2025-04-18 DIAGNOSIS — F41.9 ANXIETY: ICD-10-CM

## 2025-04-18 RX ORDER — ALPRAZOLAM 0.25 MG
0.25 TABLET ORAL 2 TIMES DAILY PRN
Qty: 20 TABLET | Refills: 0 | Status: SHIPPED | OUTPATIENT
Start: 2025-04-18

## 2025-04-18 RX ORDER — ESCITALOPRAM OXALATE 20 MG/1
30 TABLET ORAL DAILY
COMMUNITY
Start: 2025-04-18

## 2025-04-18 RX ORDER — HYDROXYZINE HYDROCHLORIDE 25 MG/1
25 TABLET, FILM COATED ORAL 3 TIMES DAILY PRN
Qty: 40 TABLET | Refills: 1 | Status: SHIPPED | OUTPATIENT
Start: 2025-04-18

## 2025-04-18 NOTE — TELEPHONE ENCOUNTER
Virtual Visit/Telephone Note    Marnie Gonzalez verbally consents to a Virtual/Telephone Check-In service on 25  Patient understands and accepts financial responsibility for any deductible, co-insurance and/or co-pays associated with this service.    Duration/time spent of the service: 20 Minutes of direct patient contact.  10 Minutes of chart review, documentation, medical decision making.    HPI:   Marnie Gonzalez is a 39 year old female who presents for complains of:   Chief Complaint   Patient presents with    Acute     anxiety     Anxiety: Patient does seem to having increased anxiety for the past 2 to 3 weeks, she does take Lexapro 20 mg daily, and earlier this week, was instructed to go up on the Lexapro to 30 mg this week, she is done this for 3 days, has not noted any difference.  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  We did have a long discussion she verbalized understanding.    Physical exam:   Telephone visit only, no obvious pain no obvious shortness of breath, patient sounds been her usual state of health, controlled speech, no flight of thought    ASSESSMENT AND PLAN:   Marnie Gonzalez is a 39 year old female who presents with the followin. Anxiety  I do like the idea of you staying on the Lexapro 30 mg for now, maybe reducing this back to 20 if you are feeling like the anxiety is calming over the next 2 to 3 months, but you may have to stay on this higher dosage for a few months.  Also, first step with treating some evening time anxiety would be hydroxyzine you can take 1 to 2 tablets of this, it is very Benadryl like, expected to make you a touch sleepy but may take the edge off of the anxiety levels.  If we  need something more high-powered, I did put 20 tablets of Xanax to your pharmacy, those will normally act longer, but may have a bit of a slower onset, and can be used, you should try this 1 in the evening time as well, but it may be more appropriate to take the Xanax during the day if the hydroxyzine makes you sleepy.  Make sure you give me some feedback on this, but hopefully it is a temporary solution here that will pass, and the mild increase of the Lexapro should level your system and 2 to 3 weeks.  - escitalopram 20 MG Oral Tab; Take 1.5 tablets (30 mg total) by mouth daily.  - hydrOXYzine 25 MG Oral Tab; Take 1 tablet (25 mg total) by mouth 3 (three) times daily as needed for Itching.  Dispense: 40 tablet; Refill: 1  - ALPRAZolam (XANAX) 0.25 MG Oral Tab; Take 1 tablet (0.25 mg total) by mouth 2 (two) times daily as needed.  Dispense: 20 tablet; Refill: 0    Jeff Anthony D'Amico, DO  4/18/2025  6:53 PM    Spent 30 minutes obtaining history, evaluating patient, discussing treatment options, diet, exercise, review of available labs and radiology reports, and completing documentation.

## 2025-06-17 NOTE — TELEPHONE ENCOUNTER
S: Pain, numbness and tingling    B: LOV     Clinically, This  patient presented with constant HA last week, along with burning pain in her all limbs and joint pain, rashes in arms,   HA and tinning sensation is likely complicated migraine status,   joint

## 2025-07-08 ENCOUNTER — NURSE TRIAGE (OUTPATIENT)
Dept: INTERNAL MEDICINE CLINIC | Facility: CLINIC | Age: 40
End: 2025-07-08

## 2025-07-08 NOTE — TELEPHONE ENCOUNTER
Action Requested: Summary for Provider     []  Critical Lab, Recommendations Needed  [] Need Additional Advice  []   FYI    []   Need Orders  [] Need Medications Sent to Pharmacy  []  Other     SUMMARY: office visit 7/9/25 for depression    Patient states has symptoms of feeling overwhelmed \"we have a lot going on right now, financially, medically, emotionally, family\"  patient states she would stay in bed all day if she could but has kids that she needs to take care of.  Patient tearful during call.  Patient states she has a history of anxiety and depressive symptoms are new.      Patient denies thoughts of SI or HI.  [place] scale is zero.    Patient has been managing anxiety with lexapro and as needed xanax.  Patient states she uses hydroxyzine prn for sleep     Patient is uncertain who she wants to establish care with.  Agrees to see Dr Juarez for symptoms and then will schedule office visit to establish care.          Reason for call: No chief complaint on file.  Onset: Data Unavailable                     Reason for Disposition   Symptoms interfere with work or school    Protocols used: Depression-A-[place]

## 2025-07-09 PROBLEM — F43.23 ADJUSTMENT REACTION WITH ANXIETY AND DEPRESSION: Status: ACTIVE | Noted: 2025-07-09

## 2025-07-16 ENCOUNTER — TELEPHONE (OUTPATIENT)
Age: 40
End: 2025-07-16

## 2025-07-29 ENCOUNTER — EKG ENCOUNTER (OUTPATIENT)
Dept: LAB | Age: 40
End: 2025-07-29
Attending: STUDENT IN AN ORGANIZED HEALTH CARE EDUCATION/TRAINING PROGRAM

## 2025-07-29 ENCOUNTER — OFFICE VISIT (OUTPATIENT)
Dept: INTERNAL MEDICINE CLINIC | Facility: CLINIC | Age: 40
End: 2025-07-29

## 2025-07-29 VITALS
HEIGHT: 59 IN | WEIGHT: 151 LBS | BODY MASS INDEX: 30.44 KG/M2 | HEART RATE: 74 BPM | DIASTOLIC BLOOD PRESSURE: 64 MMHG | SYSTOLIC BLOOD PRESSURE: 100 MMHG

## 2025-07-29 DIAGNOSIS — Z00.00 ANNUAL PHYSICAL EXAM: ICD-10-CM

## 2025-07-29 DIAGNOSIS — Z82.49 FAMILY HISTORY OF EARLY CAD: ICD-10-CM

## 2025-07-29 DIAGNOSIS — E55.9 VITAMIN D DEFICIENCY: ICD-10-CM

## 2025-07-29 DIAGNOSIS — Z00.00 ANNUAL PHYSICAL EXAM: Primary | ICD-10-CM

## 2025-07-29 DIAGNOSIS — Z12.4 CERVICAL CANCER SCREENING: ICD-10-CM

## 2025-07-29 DIAGNOSIS — R09.81 NASAL CONGESTION: ICD-10-CM

## 2025-07-29 DIAGNOSIS — J32.9 RECURRENT SINUS INFECTIONS: ICD-10-CM

## 2025-07-29 DIAGNOSIS — F43.23 ADJUSTMENT REACTION WITH ANXIETY AND DEPRESSION: ICD-10-CM

## 2025-07-29 LAB
ALBUMIN SERPL-MCNC: 4.6 G/DL (ref 3.2–4.8)
ALBUMIN/GLOB SERPL: 2 (ref 1–2)
ALP LIVER SERPL-CCNC: 71 U/L (ref 37–98)
ALT SERPL-CCNC: 17 U/L (ref 10–49)
ANION GAP SERPL CALC-SCNC: 6 MMOL/L (ref 0–18)
AST SERPL-CCNC: 15 U/L (ref ?–34)
ATRIAL RATE: 71 BPM
BASOPHILS # BLD AUTO: 0.08 X10(3) UL (ref 0–0.2)
BASOPHILS NFR BLD AUTO: 0.7 %
BILIRUB SERPL-MCNC: 0.2 MG/DL (ref 0.3–1.2)
BUN BLD-MCNC: 13 MG/DL (ref 9–23)
BUN/CREAT SERPL: 16.3 (ref 10–20)
CALCIUM BLD-MCNC: 9.4 MG/DL (ref 8.7–10.4)
CHLORIDE SERPL-SCNC: 102 MMOL/L (ref 98–112)
CHOLEST SERPL-MCNC: 177 MG/DL (ref ?–200)
CO2 SERPL-SCNC: 30 MMOL/L (ref 21–32)
CREAT BLD-MCNC: 0.8 MG/DL (ref 0.55–1.02)
DEPRECATED RDW RBC AUTO: 40.9 FL (ref 35.1–46.3)
EGFRCR SERPLBLD CKD-EPI 2021: 97 ML/MIN/1.73M2 (ref 60–?)
EOSINOPHIL # BLD AUTO: 0.51 X10(3) UL (ref 0–0.7)
EOSINOPHIL NFR BLD AUTO: 4.6 %
ERYTHROCYTE [DISTWIDTH] IN BLOOD BY AUTOMATED COUNT: 12 % (ref 11–15)
EST. AVERAGE GLUCOSE BLD GHB EST-MCNC: 111 MG/DL (ref 68–126)
FASTING PATIENT LIPID ANSWER: NO
FASTING STATUS PATIENT QL REPORTED: NO
GLOBULIN PLAS-MCNC: 2.3 G/DL (ref 2–3.5)
GLUCOSE BLD-MCNC: 94 MG/DL (ref 70–99)
HBA1C MFR BLD: 5.5 % (ref ?–5.7)
HCT VFR BLD AUTO: 36.4 % (ref 35–48)
HDLC SERPL-MCNC: 63 MG/DL (ref 40–59)
HGB BLD-MCNC: 11.9 G/DL (ref 12–16)
IMM GRANULOCYTES # BLD AUTO: 0.02 X10(3) UL (ref 0–1)
IMM GRANULOCYTES NFR BLD: 0.2 %
LDLC SERPL CALC-MCNC: 92 MG/DL (ref ?–100)
LYMPHOCYTES # BLD AUTO: 3.12 X10(3) UL (ref 1–4)
LYMPHOCYTES NFR BLD AUTO: 28.3 %
MCH RBC QN AUTO: 30.4 PG (ref 26–34)
MCHC RBC AUTO-ENTMCNC: 32.7 G/DL (ref 31–37)
MCV RBC AUTO: 92.9 FL (ref 80–100)
MONOCYTES # BLD AUTO: 0.87 X10(3) UL (ref 0.1–1)
MONOCYTES NFR BLD AUTO: 7.9 %
NEUTROPHILS # BLD AUTO: 6.42 X10 (3) UL (ref 1.5–7.7)
NEUTROPHILS # BLD AUTO: 6.42 X10(3) UL (ref 1.5–7.7)
NEUTROPHILS NFR BLD AUTO: 58.3 %
NONHDLC SERPL-MCNC: 114 MG/DL (ref ?–130)
OSMOLALITY SERPL CALC.SUM OF ELEC: 286 MOSM/KG (ref 275–295)
P AXIS: 34 DEGREES
P-R INTERVAL: 148 MS
PLATELET # BLD AUTO: 398 10(3)UL (ref 150–450)
POTASSIUM SERPL-SCNC: 4 MMOL/L (ref 3.5–5.1)
PROT SERPL-MCNC: 6.9 G/DL (ref 5.7–8.2)
Q-T INTERVAL: 388 MS
QRS DURATION: 72 MS
QTC CALCULATION (BEZET): 421 MS
R AXIS: 40 DEGREES
RBC # BLD AUTO: 3.92 X10(6)UL (ref 3.8–5.3)
SODIUM SERPL-SCNC: 138 MMOL/L (ref 136–145)
T AXIS: 34 DEGREES
TRIGL SERPL-MCNC: 124 MG/DL (ref 30–149)
TSI SER-ACNC: 1.62 UIU/ML (ref 0.55–4.78)
VENTRICULAR RATE: 71 BPM
VIT D+METAB SERPL-MCNC: 25.1 NG/ML (ref 30–100)
VLDLC SERPL CALC-MCNC: 20 MG/DL (ref 0–30)
WBC # BLD AUTO: 11 X10(3) UL (ref 4–11)

## 2025-07-29 PROCEDURE — 93005 ELECTROCARDIOGRAM TRACING: CPT

## 2025-07-29 PROCEDURE — 80061 LIPID PANEL: CPT

## 2025-07-29 PROCEDURE — 36415 COLL VENOUS BLD VENIPUNCTURE: CPT

## 2025-07-29 PROCEDURE — 84443 ASSAY THYROID STIM HORMONE: CPT

## 2025-07-29 PROCEDURE — 85025 COMPLETE CBC W/AUTO DIFF WBC: CPT

## 2025-07-29 PROCEDURE — 83036 HEMOGLOBIN GLYCOSYLATED A1C: CPT

## 2025-07-29 PROCEDURE — 82306 VITAMIN D 25 HYDROXY: CPT

## 2025-07-29 PROCEDURE — 80053 COMPREHEN METABOLIC PANEL: CPT

## 2025-07-29 PROCEDURE — 93010 ELECTROCARDIOGRAM REPORT: CPT | Performed by: INTERNAL MEDICINE

## 2025-07-29 RX ORDER — FEXOFENADINE HCL AND PSEUDOEPHEDRINE HCL 180; 240 MG/1; MG/1
1 TABLET, EXTENDED RELEASE ORAL DAILY
Qty: 90 TABLET | Refills: 3 | Status: SHIPPED | OUTPATIENT
Start: 2025-07-29 | End: 2026-07-24

## 2025-07-29 RX ORDER — ESCITALOPRAM OXALATE 20 MG/1
30 TABLET ORAL DAILY
Qty: 135 TABLET | Refills: 3 | Status: SHIPPED | OUTPATIENT
Start: 2025-07-29

## 2025-07-29 RX ORDER — BUSPIRONE HYDROCHLORIDE 7.5 MG/1
7.5 TABLET ORAL 2 TIMES DAILY
Qty: 180 TABLET | Refills: 3 | Status: SHIPPED | OUTPATIENT
Start: 2025-07-29

## 2025-07-29 RX ORDER — FLUTICASONE PROPIONATE 50 MCG
2 SPRAY, SUSPENSION (ML) NASAL 2 TIMES DAILY
Qty: 3 EACH | Refills: 3 | Status: SHIPPED | OUTPATIENT
Start: 2025-07-29 | End: 2026-07-24

## 2025-07-29 RX ORDER — AZELASTINE HYDROCHLORIDE 137 UG/1
1 SPRAY, METERED NASAL 2 TIMES DAILY
Qty: 30 ML | Refills: 11 | Status: SHIPPED | OUTPATIENT
Start: 2025-07-29

## 2025-07-30 ENCOUNTER — PATIENT OUTREACH (OUTPATIENT)
Dept: CASE MANAGEMENT | Age: 40
End: 2025-07-30

## 2025-07-30 ENCOUNTER — TELEPHONE (OUTPATIENT)
Dept: FAMILY MEDICINE CLINIC | Facility: CLINIC | Age: 40
End: 2025-07-30

## 2025-07-30 ENCOUNTER — TELEPHONE (OUTPATIENT)
Dept: INTERNAL MEDICINE CLINIC | Facility: CLINIC | Age: 40
End: 2025-07-30

## 2025-08-15 ENCOUNTER — TELEPHONE (OUTPATIENT)
Age: 40
End: 2025-08-15

## (undated) DIAGNOSIS — J10.1 INFLUENZA A: Primary | ICD-10-CM

## (undated) NOTE — LETTER
Date & Time: 2/11/2024, 9:51 PM  Patient: Marnie Gonzalez  Encounter Provider(s):    Ector Velasquez MD       To Whom It May Concern:    Marnie Gonzalez was seen and treated in our department on 2/11/2024. She should not return to work until 2/15/24 .    If you have any questions or concerns, please do not hesitate to call.        _____________________________  RN Signature

## (undated) NOTE — LETTER
Date: 1/16/2024    Patient Name: Marnie Gonzalez          To Whom it may concern:    This letter has been written at the patient's request. The above patient was seen at the [facility] for treatment of a medical condition.    This patient should be excused from attending work through 11/17/24.    The patient may return to work on or around 1/18/24 if symptoms are mostly improved per patient report.        Sincerely,        PRACHI Hussein

## (undated) NOTE — Clinical Note
April 3, 2017    [address] [facility]      Dear Winnie Shaffer: The following are the results of your recent tests ordered by [address] [place] [address]. Please review the list of test results.   Your result is the v

## (undated) NOTE — LETTER
Date & Time: 9/13/2023, 4:49 PM  Patient: Teressa Pereira [facility]  Encounter Provider(s):    PRACHI Salinas       To Whom It May Concern:    Alicja Dawkins was seen and treated in our department on 9/13/2023. She should not return to work until symptoms improve . If you have any questions or concerns, please do not hesitate to call.         Will JARA

## (undated) NOTE — LETTER
Date: 3/15/2022    Patient Name: Sandor Gamino          To Whom it may concern: This letter has been written at the patient's request. The above patient was seen at the [facility] for treatment of a medical condition. This patient should be excused from attending work/school from 3/14/22 through 3/16/22. The patient may return to work/school on next scheduled day with the following limitations none. Sincerely,        MAREK Fisher  [facility]  03/15/22  1:55 PM

## (undated) NOTE — LETTER
Date & Time: 6/12/2023, 8:56 AM  Patient: [facility]  Encounter Provider(s):    Rylee Billy MD       To Whom It May Concern:    Sri Sewell was seen and treated in our department on 6/12/2023. She should not return to work until 6/14 .     If you have any questions or concerns, please do not hesitate to call.        _____________________________  Physician/APC Signature

## (undated) NOTE — LETTER
7/17/2023          To Whom It May Concern:    Stephanie Brady is currently under my medical care and may not return to work at this time. Please excuse Marnie for 3 days. She may return to work on 7/20/2023, barring any setbacks. If she does feel she can return sooner, she will reach out. Activity is restricted as follows: none. If you require additional information please contact our office.         Sincerely,    Sabra Orozco DO          Document generated by:  Sabra Orozco DO